# Patient Record
Sex: MALE | Race: WHITE | NOT HISPANIC OR LATINO | Employment: PART TIME | URBAN - METROPOLITAN AREA
[De-identification: names, ages, dates, MRNs, and addresses within clinical notes are randomized per-mention and may not be internally consistent; named-entity substitution may affect disease eponyms.]

---

## 2018-11-06 ENCOUNTER — OFFICE VISIT - HEALTHEAST (OUTPATIENT)
Dept: FAMILY MEDICINE | Facility: CLINIC | Age: 63
End: 2018-11-06

## 2018-11-06 DIAGNOSIS — R03.0 ELEVATED BLOOD-PRESSURE READING WITHOUT DIAGNOSIS OF HYPERTENSION: ICD-10-CM

## 2018-11-06 DIAGNOSIS — R97.20 INCREASED PROSTATE SPECIFIC ANTIGEN (PSA) VELOCITY: ICD-10-CM

## 2018-11-06 DIAGNOSIS — Z12.11 SCREEN FOR COLON CANCER: ICD-10-CM

## 2018-11-06 DIAGNOSIS — Z71.6 ENCOUNTER FOR SMOKING CESSATION COUNSELING: ICD-10-CM

## 2018-11-06 DIAGNOSIS — Z00.00 ANNUAL PHYSICAL EXAM: ICD-10-CM

## 2018-11-06 DIAGNOSIS — E78.1 PURE HYPERGLYCERIDEMIA: ICD-10-CM

## 2018-11-06 LAB
ALBUMIN SERPL-MCNC: 3.8 G/DL (ref 3.5–5)
ALP SERPL-CCNC: 76 U/L (ref 45–120)
ALT SERPL W P-5'-P-CCNC: 70 U/L (ref 0–45)
ANION GAP SERPL CALCULATED.3IONS-SCNC: 10 MMOL/L (ref 5–18)
AST SERPL W P-5'-P-CCNC: 36 U/L (ref 0–40)
BILIRUB SERPL-MCNC: 0.8 MG/DL (ref 0–1)
BUN SERPL-MCNC: 14 MG/DL (ref 8–22)
CALCIUM SERPL-MCNC: 9.6 MG/DL (ref 8.5–10.5)
CHLORIDE BLD-SCNC: 102 MMOL/L (ref 98–107)
CHOLEST SERPL-MCNC: 194 MG/DL
CO2 SERPL-SCNC: 26 MMOL/L (ref 22–31)
CREAT SERPL-MCNC: 0.84 MG/DL (ref 0.7–1.3)
FASTING STATUS PATIENT QL REPORTED: YES
GFR SERPL CREATININE-BSD FRML MDRD: >60 ML/MIN/1.73M2
GLUCOSE BLD-MCNC: 100 MG/DL (ref 70–125)
HBA1C MFR BLD: 5.6 % (ref 3.5–6)
HDLC SERPL-MCNC: 32 MG/DL
LDLC SERPL CALC-MCNC: 125 MG/DL
POTASSIUM BLD-SCNC: 4.5 MMOL/L (ref 3.5–5)
PROT SERPL-MCNC: 6.9 G/DL (ref 6–8)
PSA SERPL-MCNC: 2.7 NG/ML (ref 0–4.5)
SODIUM SERPL-SCNC: 138 MMOL/L (ref 136–145)
TRIGL SERPL-MCNC: 185 MG/DL

## 2018-11-06 ASSESSMENT — MIFFLIN-ST. JEOR: SCORE: 1879.73

## 2018-11-13 ENCOUNTER — COMMUNICATION - HEALTHEAST (OUTPATIENT)
Dept: FAMILY MEDICINE | Facility: CLINIC | Age: 63
End: 2018-11-13

## 2018-11-14 ENCOUNTER — COMMUNICATION - HEALTHEAST (OUTPATIENT)
Dept: FAMILY MEDICINE | Facility: CLINIC | Age: 63
End: 2018-11-14

## 2018-11-30 ENCOUNTER — COMMUNICATION - HEALTHEAST (OUTPATIENT)
Dept: FAMILY MEDICINE | Facility: CLINIC | Age: 63
End: 2018-11-30

## 2018-12-04 ENCOUNTER — OFFICE VISIT - HEALTHEAST (OUTPATIENT)
Dept: FAMILY MEDICINE | Facility: CLINIC | Age: 63
End: 2018-12-04

## 2018-12-04 DIAGNOSIS — I10 HYPERTENSION, UNSPECIFIED TYPE: ICD-10-CM

## 2018-12-04 DIAGNOSIS — E78.5 DYSLIPIDEMIA: ICD-10-CM

## 2018-12-04 DIAGNOSIS — F17.200 TOBACCO USE DISORDER: ICD-10-CM

## 2018-12-11 ENCOUNTER — COMMUNICATION - HEALTHEAST (OUTPATIENT)
Dept: FAMILY MEDICINE | Facility: CLINIC | Age: 63
End: 2018-12-11

## 2018-12-12 ENCOUNTER — COMMUNICATION - HEALTHEAST (OUTPATIENT)
Dept: FAMILY MEDICINE | Facility: CLINIC | Age: 63
End: 2018-12-12

## 2019-01-04 ENCOUNTER — RECORDS - HEALTHEAST (OUTPATIENT)
Dept: ADMINISTRATIVE | Facility: OTHER | Age: 64
End: 2019-01-04

## 2019-01-15 ENCOUNTER — COMMUNICATION - HEALTHEAST (OUTPATIENT)
Dept: FAMILY MEDICINE | Facility: CLINIC | Age: 64
End: 2019-01-15

## 2019-01-15 DIAGNOSIS — I10 HYPERTENSION, UNSPECIFIED TYPE: ICD-10-CM

## 2019-03-18 ENCOUNTER — COMMUNICATION - HEALTHEAST (OUTPATIENT)
Dept: FAMILY MEDICINE | Facility: CLINIC | Age: 64
End: 2019-03-18

## 2019-05-08 ENCOUNTER — COMMUNICATION - HEALTHEAST (OUTPATIENT)
Dept: FAMILY MEDICINE | Facility: CLINIC | Age: 64
End: 2019-05-08

## 2019-05-08 ENCOUNTER — AMBULATORY - HEALTHEAST (OUTPATIENT)
Dept: NURSING | Facility: CLINIC | Age: 64
End: 2019-05-08

## 2019-05-08 ENCOUNTER — AMBULATORY - HEALTHEAST (OUTPATIENT)
Dept: LAB | Facility: CLINIC | Age: 64
End: 2019-05-08

## 2019-05-08 DIAGNOSIS — I10 HYPERTENSION, UNSPECIFIED TYPE: ICD-10-CM

## 2019-05-08 LAB
ANION GAP SERPL CALCULATED.3IONS-SCNC: 10 MMOL/L (ref 5–18)
BUN SERPL-MCNC: 19 MG/DL (ref 8–22)
CALCIUM SERPL-MCNC: 9.7 MG/DL (ref 8.5–10.5)
CHLORIDE BLD-SCNC: 106 MMOL/L (ref 98–107)
CO2 SERPL-SCNC: 24 MMOL/L (ref 22–31)
CREAT SERPL-MCNC: 0.98 MG/DL (ref 0.7–1.3)
GFR SERPL CREATININE-BSD FRML MDRD: >60 ML/MIN/1.73M2
GLUCOSE BLD-MCNC: 110 MG/DL (ref 70–125)
POTASSIUM BLD-SCNC: 4.3 MMOL/L (ref 3.5–5)
SODIUM SERPL-SCNC: 140 MMOL/L (ref 136–145)

## 2019-10-02 ENCOUNTER — COMMUNICATION - HEALTHEAST (OUTPATIENT)
Dept: FAMILY MEDICINE | Facility: CLINIC | Age: 64
End: 2019-10-02

## 2019-10-02 DIAGNOSIS — I10 HYPERTENSION, UNSPECIFIED TYPE: ICD-10-CM

## 2019-10-24 ENCOUNTER — COMMUNICATION - HEALTHEAST (OUTPATIENT)
Dept: FAMILY MEDICINE | Facility: CLINIC | Age: 64
End: 2019-10-24

## 2019-10-25 ENCOUNTER — AMBULATORY - HEALTHEAST (OUTPATIENT)
Dept: NURSING | Facility: CLINIC | Age: 64
End: 2019-10-25

## 2020-01-22 ENCOUNTER — OFFICE VISIT - HEALTHEAST (OUTPATIENT)
Dept: FAMILY MEDICINE | Facility: CLINIC | Age: 65
End: 2020-01-22

## 2020-01-22 DIAGNOSIS — I10 HYPERTENSION, UNSPECIFIED TYPE: ICD-10-CM

## 2020-01-22 DIAGNOSIS — E55.9 VITAMIN D INSUFFICIENCY: ICD-10-CM

## 2020-01-22 DIAGNOSIS — E78.1 PURE HYPERGLYCERIDEMIA: ICD-10-CM

## 2020-01-22 DIAGNOSIS — F17.200 TOBACCO USE DISORDER: ICD-10-CM

## 2020-01-22 DIAGNOSIS — Z12.5 SCREENING PSA (PROSTATE SPECIFIC ANTIGEN): ICD-10-CM

## 2020-01-22 DIAGNOSIS — E66.09 CLASS 1 OBESITY DUE TO EXCESS CALORIES WITHOUT SERIOUS COMORBIDITY WITH BODY MASS INDEX (BMI) OF 32.0 TO 32.9 IN ADULT: ICD-10-CM

## 2020-01-22 DIAGNOSIS — D12.6 BENIGN NEOPLASM OF COLON, UNSPECIFIED PART OF COLON: ICD-10-CM

## 2020-01-22 DIAGNOSIS — E66.811 CLASS 1 OBESITY DUE TO EXCESS CALORIES WITHOUT SERIOUS COMORBIDITY WITH BODY MASS INDEX (BMI) OF 32.0 TO 32.9 IN ADULT: ICD-10-CM

## 2020-01-22 LAB
ALBUMIN SERPL-MCNC: 4 G/DL (ref 3.5–5)
ALP SERPL-CCNC: 77 U/L (ref 45–120)
ALT SERPL W P-5'-P-CCNC: 86 U/L (ref 0–45)
ANION GAP SERPL CALCULATED.3IONS-SCNC: 11 MMOL/L (ref 5–18)
AST SERPL W P-5'-P-CCNC: 41 U/L (ref 0–40)
BILIRUB SERPL-MCNC: 0.7 MG/DL (ref 0–1)
BUN SERPL-MCNC: 16 MG/DL (ref 8–22)
CALCIUM SERPL-MCNC: 9.2 MG/DL (ref 8.5–10.5)
CHLORIDE BLD-SCNC: 104 MMOL/L (ref 98–107)
CHOLEST SERPL-MCNC: 225 MG/DL
CO2 SERPL-SCNC: 23 MMOL/L (ref 22–31)
CREAT SERPL-MCNC: 0.91 MG/DL (ref 0.7–1.3)
FASTING STATUS PATIENT QL REPORTED: YES
GFR SERPL CREATININE-BSD FRML MDRD: >60 ML/MIN/1.73M2
GLUCOSE BLD-MCNC: 104 MG/DL (ref 70–125)
HDLC SERPL-MCNC: 32 MG/DL
LDLC SERPL CALC-MCNC: 156 MG/DL
POTASSIUM BLD-SCNC: 5 MMOL/L (ref 3.5–5)
PROT SERPL-MCNC: 7 G/DL (ref 6–8)
PSA SERPL-MCNC: 3.1 NG/ML (ref 0–4.5)
SODIUM SERPL-SCNC: 138 MMOL/L (ref 136–145)
TRIGL SERPL-MCNC: 185 MG/DL

## 2020-01-22 ASSESSMENT — MIFFLIN-ST. JEOR: SCORE: 1830.89

## 2020-01-23 ENCOUNTER — COMMUNICATION - HEALTHEAST (OUTPATIENT)
Dept: FAMILY MEDICINE | Facility: CLINIC | Age: 65
End: 2020-01-23

## 2020-01-23 DIAGNOSIS — R74.8 ELEVATED LIVER ENZYMES: ICD-10-CM

## 2020-01-23 LAB
25(OH)D3 SERPL-MCNC: 22.2 NG/ML (ref 30–80)
25(OH)D3 SERPL-MCNC: 22.2 NG/ML (ref 30–80)

## 2020-01-27 ENCOUNTER — COMMUNICATION - HEALTHEAST (OUTPATIENT)
Dept: FAMILY MEDICINE | Facility: CLINIC | Age: 65
End: 2020-01-27

## 2020-01-29 ENCOUNTER — AMBULATORY - HEALTHEAST (OUTPATIENT)
Dept: NURSING | Facility: CLINIC | Age: 65
End: 2020-01-29

## 2020-01-29 ENCOUNTER — COMMUNICATION - HEALTHEAST (OUTPATIENT)
Dept: FAMILY MEDICINE | Facility: CLINIC | Age: 65
End: 2020-01-29

## 2020-01-29 ENCOUNTER — AMBULATORY - HEALTHEAST (OUTPATIENT)
Dept: FAMILY MEDICINE | Facility: CLINIC | Age: 65
End: 2020-01-29

## 2020-01-29 DIAGNOSIS — I10 BENIGN ESSENTIAL HYPERTENSION: ICD-10-CM

## 2020-01-29 DIAGNOSIS — F17.200 TOBACCO USE DISORDER: ICD-10-CM

## 2020-02-07 ENCOUNTER — AMBULATORY - HEALTHEAST (OUTPATIENT)
Dept: NURSING | Facility: CLINIC | Age: 65
End: 2020-02-07

## 2020-02-25 ENCOUNTER — COMMUNICATION - HEALTHEAST (OUTPATIENT)
Dept: FAMILY MEDICINE | Facility: CLINIC | Age: 65
End: 2020-02-25

## 2020-02-25 ENCOUNTER — HOSPITAL ENCOUNTER (OUTPATIENT)
Dept: CT IMAGING | Facility: HOSPITAL | Age: 65
Discharge: HOME | End: 2020-02-25
Attending: FAMILY MEDICINE

## 2020-02-25 DIAGNOSIS — F17.200 TOBACCO USE DISORDER: ICD-10-CM

## 2020-02-26 ENCOUNTER — OFFICE VISIT - HEALTHEAST (OUTPATIENT)
Dept: FAMILY MEDICINE | Facility: CLINIC | Age: 65
End: 2020-02-26

## 2020-02-26 ENCOUNTER — COMMUNICATION - HEALTHEAST (OUTPATIENT)
Dept: FAMILY MEDICINE | Facility: CLINIC | Age: 65
End: 2020-02-26

## 2020-02-26 DIAGNOSIS — I10 BENIGN ESSENTIAL HYPERTENSION: ICD-10-CM

## 2020-02-26 LAB
ANION GAP SERPL CALCULATED.3IONS-SCNC: 9 MMOL/L (ref 5–18)
BUN SERPL-MCNC: 15 MG/DL (ref 8–22)
CALCIUM SERPL-MCNC: 9.5 MG/DL (ref 8.5–10.5)
CHLORIDE BLD-SCNC: 102 MMOL/L (ref 98–107)
CO2 SERPL-SCNC: 25 MMOL/L (ref 22–31)
CREAT SERPL-MCNC: 0.94 MG/DL (ref 0.7–1.3)
GFR SERPL CREATININE-BSD FRML MDRD: >60 ML/MIN/1.73M2
GLUCOSE BLD-MCNC: 107 MG/DL (ref 70–125)
POTASSIUM BLD-SCNC: 4.4 MMOL/L (ref 3.5–5)
SODIUM SERPL-SCNC: 136 MMOL/L (ref 136–145)

## 2020-03-17 ENCOUNTER — COMMUNICATION - HEALTHEAST (OUTPATIENT)
Dept: FAMILY MEDICINE | Facility: CLINIC | Age: 65
End: 2020-03-17

## 2020-04-15 ENCOUNTER — AMBULATORY - HEALTHEAST (OUTPATIENT)
Dept: LAB | Facility: CLINIC | Age: 65
End: 2020-04-15

## 2020-04-15 DIAGNOSIS — R74.8 ELEVATED LIVER ENZYMES: ICD-10-CM

## 2020-04-15 LAB
ALBUMIN SERPL-MCNC: 4.2 G/DL (ref 3.5–5)
ALP SERPL-CCNC: 75 U/L (ref 45–120)
ALT SERPL W P-5'-P-CCNC: 102 U/L (ref 0–45)
AST SERPL W P-5'-P-CCNC: 47 U/L (ref 0–40)
BILIRUB DIRECT SERPL-MCNC: 0.3 MG/DL
BILIRUB SERPL-MCNC: 0.9 MG/DL (ref 0–1)
PROT SERPL-MCNC: 7.5 G/DL (ref 6–8)

## 2020-04-16 ENCOUNTER — COMMUNICATION - HEALTHEAST (OUTPATIENT)
Dept: FAMILY MEDICINE | Facility: CLINIC | Age: 65
End: 2020-04-16

## 2020-04-17 ENCOUNTER — COMMUNICATION - HEALTHEAST (OUTPATIENT)
Dept: FAMILY MEDICINE | Facility: CLINIC | Age: 65
End: 2020-04-17

## 2020-04-17 ENCOUNTER — AMBULATORY - HEALTHEAST (OUTPATIENT)
Dept: FAMILY MEDICINE | Facility: CLINIC | Age: 65
End: 2020-04-17

## 2020-04-17 DIAGNOSIS — R74.8 ELEVATED LIVER ENZYMES: ICD-10-CM

## 2020-10-13 ENCOUNTER — COMMUNICATION - HEALTHEAST (OUTPATIENT)
Dept: FAMILY MEDICINE | Facility: CLINIC | Age: 65
End: 2020-10-13

## 2020-10-13 DIAGNOSIS — F17.200 TOBACCO USE DISORDER: ICD-10-CM

## 2020-10-14 RX ORDER — VARENICLINE TARTRATE 1 MG/1
TABLET, FILM COATED ORAL
Qty: 60 TABLET | Refills: 1 | Status: SHIPPED | OUTPATIENT
Start: 2020-10-14 | End: 2021-12-14

## 2020-11-11 ENCOUNTER — COMMUNICATION - HEALTHEAST (OUTPATIENT)
Dept: FAMILY MEDICINE | Facility: CLINIC | Age: 65
End: 2020-11-11

## 2020-12-17 ENCOUNTER — COMMUNICATION - HEALTHEAST (OUTPATIENT)
Dept: FAMILY MEDICINE | Facility: CLINIC | Age: 65
End: 2020-12-17

## 2020-12-17 DIAGNOSIS — I10 HYPERTENSION, UNSPECIFIED TYPE: ICD-10-CM

## 2021-01-21 ENCOUNTER — COMMUNICATION - HEALTHEAST (OUTPATIENT)
Dept: FAMILY MEDICINE | Facility: CLINIC | Age: 66
End: 2021-01-21

## 2021-02-24 ENCOUNTER — COMMUNICATION - HEALTHEAST (OUTPATIENT)
Dept: FAMILY MEDICINE | Facility: CLINIC | Age: 66
End: 2021-02-24

## 2021-02-24 ENCOUNTER — COMMUNICATION - HEALTHEAST (OUTPATIENT)
Dept: PULMONOLOGY | Facility: OTHER | Age: 66
End: 2021-02-24

## 2021-02-24 DIAGNOSIS — I10 BENIGN ESSENTIAL HYPERTENSION: ICD-10-CM

## 2021-03-16 ENCOUNTER — COMMUNICATION - HEALTHEAST (OUTPATIENT)
Dept: FAMILY MEDICINE | Facility: CLINIC | Age: 66
End: 2021-03-16

## 2021-03-16 DIAGNOSIS — I10 HYPERTENSION, UNSPECIFIED TYPE: ICD-10-CM

## 2021-05-24 ENCOUNTER — RECORDS - HEALTHEAST (OUTPATIENT)
Dept: ADMINISTRATIVE | Facility: OTHER | Age: 66
End: 2021-05-24

## 2021-05-24 ENCOUNTER — RECORDS - HEALTHEAST (OUTPATIENT)
Dept: FAMILY MEDICINE | Facility: CLINIC | Age: 66
End: 2021-05-24

## 2021-05-24 DIAGNOSIS — I10 BENIGN ESSENTIAL HYPERTENSION: ICD-10-CM

## 2021-05-24 RX ORDER — HYDROCHLOROTHIAZIDE 12.5 MG/1
12.5 CAPSULE ORAL DAILY
Qty: 30 CAPSULE | Refills: 0 | Status: SHIPPED | OUTPATIENT
Start: 2021-05-24 | End: 2021-12-22

## 2021-05-27 VITALS — HEART RATE: 56 BPM | DIASTOLIC BLOOD PRESSURE: 87 MMHG | SYSTOLIC BLOOD PRESSURE: 155 MMHG

## 2021-05-27 VITALS — DIASTOLIC BLOOD PRESSURE: 82 MMHG | SYSTOLIC BLOOD PRESSURE: 138 MMHG

## 2021-05-28 NOTE — TELEPHONE ENCOUNTER
Who is calling:  Patient  Reason for Call:  Patient states he received voicemail message from care team to call back- Writer did not see an open encounter with a message to relay to patient- He thinks it may have something to do with nurse visit for blood pressure today.   Date of last appointment with primary care: 05/08/19  Okay to leave a detailed message: Yes

## 2021-05-28 NOTE — PROGRESS NOTES
Please inform patient that the blood pressure is in desired range.  Continue with current medications and plan.  Mehul Del Toro MD  5/8/2019

## 2021-05-28 NOTE — PROGRESS NOTES
I met with Gualberto Wood at the request of Dr. Del Toro to recheck his blood pressure.  Blood pressure medications on the MAR were reviewed with patient.    Patient has taken all medications as per usual regimen: Yes  Patient reports tolerating them without any issues or concerns: Yes    Vitals:    05/08/19 0727   BP: 124/86       Blood pressure was taken, previous encounter was reviewed, recorded blood pressure below 140/90.  Patient was discharged and the note will be sent to the provider for final review.

## 2021-06-01 NOTE — TELEPHONE ENCOUNTER
Lisinopril pending for PCP review    Requested Prescriptions     Pending Prescriptions Disp Refills     lisinopril (PRINIVIL,ZESTRIL) 10 MG tablet 180 tablet 3     Sig: Take 2 tablets (20 mg total) by mouth daily.

## 2021-06-01 NOTE — TELEPHONE ENCOUNTER
Mehul  I'll send this your way as it looks like all care and communication has been with you over the past year.  Feel free to make a change in the PCP in the header.  Thanks.  Brett

## 2021-06-01 NOTE — TELEPHONE ENCOUNTER
Medication refill.  Patient needs to schedule annual physical.  Mehul Del Toro MD  10/2/2019

## 2021-06-02 VITALS — HEIGHT: 70 IN | WEIGHT: 239 LBS | BODY MASS INDEX: 34.22 KG/M2

## 2021-06-02 VITALS — BODY MASS INDEX: 34.37 KG/M2 | WEIGHT: 241.4 LBS

## 2021-06-04 VITALS
BODY MASS INDEX: 32.81 KG/M2 | WEIGHT: 229.2 LBS | DIASTOLIC BLOOD PRESSURE: 97 MMHG | HEIGHT: 70 IN | SYSTOLIC BLOOD PRESSURE: 139 MMHG | OXYGEN SATURATION: 95 % | HEART RATE: 69 BPM

## 2021-06-04 VITALS
BODY MASS INDEX: 32.94 KG/M2 | HEART RATE: 65 BPM | TEMPERATURE: 97.3 F | WEIGHT: 229.6 LBS | SYSTOLIC BLOOD PRESSURE: 118 MMHG | RESPIRATION RATE: 20 BRPM | DIASTOLIC BLOOD PRESSURE: 77 MMHG

## 2021-06-05 NOTE — PROGRESS NOTES
Follow Up Blood Pressure Check    Gualberto Wood is a 64 y.o. male recommended to follow up for blood pressure check by Brett Groves MD. Anihypertensive medications and adherence were verified: Yes.     Reason for visit: Follow up from physical    Switch the lisinopril to a 20 mg tab daily  Nurse BP check    Medication change at last visit: No- just changed to 20mg tablet instead of 2 10mg tablets     Today's Vitals:   Vitals:    01/29/20 0858 01/29/20 0901   BP: 167/88 155/87   Pulse: (!) 58 (!) 56       Home blood pressure readings brought in today:   NA    Lowest blood pressure today is 155/87 and they deny signs or symptoms of new onset: .  Please inform patient of his/her blood pressure today.  If they are asymptomatic, the patient is to continue current medications.  This message will be routed to their provider, and they will be notified if a change in medication is recommended.    ( may be deleted if not applicable) If lowest blood pressure is greater than 200/110, regardless if symptoms are present, patient needs to be evaluated by a provider today.    Hali Veloz    Current Outpatient Medications   Medication Sig Dispense Refill     aspirin 81 MG EC tablet Take 81 mg by mouth daily.       DOCOSAHEXANOIC ACID/EPA (FISH OIL ORAL) Take 1 capsule by mouth daily.       lisinopril (PRINIVIL,ZESTRIL) 20 MG tablet Take 1 tablet (20 mg total) by mouth daily. 90 tablet 3     VITAMIN B COMPLEX ORAL Take 1 tablet by mouth daily.       vitamin E acetate (VITAMIN E ORAL) Take 1 tablet by mouth every other day.       No current facility-administered medications for this visit.

## 2021-06-05 NOTE — PROGRESS NOTES
I would suggest adding hctz 12.5 mg daily.  Will call new rx.  Nurse BP check in one week  appt  To check BP and labs in 3 weeks.

## 2021-06-05 NOTE — TELEPHONE ENCOUNTER
Reason contacted:  Results  Information relayed:  Relayed results below from Dr. Groves to patient.  Pt states he will send Dr. Groves a [employer] message to discuss starting a statin.  Additional questions:  Yes-Pt states he will send Dr. Groves a [employer] message  Further follow-up needed:  Lucero Rousseau:  I just left you a phone message with the info below.  Your PSA has been slowly increasing (within the normal range and by acceptable amounts).   I would suggest a repeat PSA in one year.     The liver enzymes are mildly elevated.   Work on weight loss  To get your weight as close to 200 lbs as possible.  In the meantime you should avoid alcohol.  I would suggest repeating liver enzymes in 3 months and have entered a future lab order for this.   Just set up a lab appt in 3 months.     Your Vit D  Level is slightly low.  If you do not currently take Vit D then I would suggest you take 2000 units daily.     Lastly your cholesterol and LDL are moderately elevated. (see below)  There is a new model to calculate your 10 year Cardiovascular Disease  risk.  Using this model which takes into account age, gender, race, systolic blood pressure, cholesterol and HDL, and the presence or absence of diabetes, use of blood pressure meds or nicotine, your risk is shown below.     Your risk of having a cardiovascular event over the next 10 years is 16 %.   When this risk exceeds 7.5% one may benefit from a statin medication to lower the cholesterol.     If you start a statin then you would require close monitoring of your liver functions with the first check being 6 weeks after starting the statin.  Also you would need to monitor for any muscle soreness or pain, something that statins can sometimes cause.  If you are interested in initiating a statin medication please notify us and we can have this called to your pharmacy.     Your remaining labs are normal.

## 2021-06-05 NOTE — TELEPHONE ENCOUNTER
Brett Groves MD at 1/29/2020 10:02 AM     Status: Signed      I would suggest adding hctz 12.5 mg daily.  Will call new rx.  Nurse BP check in one week  appt  To check BP and labs in 3 weeks.

## 2021-06-05 NOTE — PATIENT INSTRUCTIONS - HE
Colonoscopy due in Jan 2022    Fasting labs    Check with insurance on Shingrix coverage    Switch the lisinopril to a 20 mg tab daily  Nurse BP check    The following high BMI interventions were performed this visit: encouragement to exercise and dietary management education, guidance, and counseling     Low dose CT scan of the chest    Next year get abdominal U/S to screen for AAA        .Lung Cancer Screening pre-scan counseling Visit    The patient fits the risk profile of patients who benefit from this screening:  -The patient is >55 years old and <80 years old  -The patient has 30 pack year history (over 30)  -The patient has smoked within the past 15 years  -The patient has no medical comorbidity severe enough that it would cause mortality prior to mortality due to the lung cancer attempting to be detected.    Discussion with patient regarding the harms associated with LDCT screening include false-negative and false-positive results, incidental findings, overdiagnosis, and radiation exposure were reviewed at length.   The patient understands that pursuing this screening test may result in a biopsy that was not necessary. It may also produce added stress over a nodule that is likely not cancer.    Of 100 patients who get screening, 25 will have a positive scan. Of those 25, only 1 will have cancer.  Overdiagnosis is estimated at 10% of patients-- they would not have been detected in the patient's lifetime without screening. Less than 1% of patients likely had death related to radiation exposure increase.   Average low-dose CT associated with 0.61 to 1.5 mSv. Annual background radiation exposure in the [place] averages 2.4 mSv; mammogram is 0.7mSv.    The benefits are reduction in risk of death from lung cancer. The number needed to treat is 320 (for every 320 patients who undergo screening, 1 patient will have a benefit in mortality from early detection from the screening).    Undergoing this screening  implies willingness to pursue further potentially invasive testing to discover potential cancer.    All questions were answered.    The patient was counseled regarding smoking cessation and its risk for lung cancer.

## 2021-06-05 NOTE — TELEPHONE ENCOUNTER
Left message to call back for: Result follow up  Information to relay to patient:  See message below.  Please relay information.

## 2021-06-05 NOTE — TELEPHONE ENCOUNTER
----- Message from Brett Groves MD sent at 1/24/2020  4:58 PM CST -----  Please review lab letter with pt and let me know if he has questions or if he wants to start a statin.  thanks

## 2021-06-05 NOTE — PROGRESS NOTES
Assessment:      Healthy male exam.    Encounter Diagnoses   Name Primary?     Benign neoplasm of colon, unspecified part of colon Yes     Essential Hypertriglyceridemia      Nicotine Dependence      Hypertension, unspecified                          The diagnosis was confirmed.  The condition is stable/controlled on LISINOPRIL and no side effects  have been noted.  The appropriate follow up labs  ( CMP )have been ordered  (OR ARE UP TO DATE) and medication refills ordered if requested.        Screening PSA (prostate specific antigen)      Vitamin D insufficiency      Class 1 obesity due to excess calories without serious comorbidity with body mass index (BMI) of 32.0 to 32.9 in adult          Plan:       All questions answered.       Colonoscopy due in Jan 2022    Fasting labs    Check with insurance on Shingrix coverage    Switch the lisinopril to a 20 mg tab daily  Nurse BP check    The following high BMI interventions were performed this visit: encouragement to exercise and dietary management education, guidance, and counseling     Low dose CT scan of the chest    Next year get abdominal U/S to screen for AAA    I have counseled the patient for tobacco cessation and the follow up will occur  at the next visit.   DISCUSSION FOR > 3 MIN REGARDING NEED TO SET A QUIT DATE (AT TIME OF HYPNOSIS) AND ALSO OF SCREENING FOR LUNG CANCER WITH A LOWDOSE CT OF THE CHEST.          .Lung Cancer Screening pre-scan counseling Visit    The patient fits the risk profile of patients who benefit from this screening:  -The patient is >55 years old and <80 years old  -The patient has 30 pack year history (over 30)  -The patient has smoked within the past 15 years  -The patient has no medical comorbidity severe enough that it would cause mortality prior to mortality due to the lung cancer attempting to be detected.    Discussion with patient regarding the harms associated with LDCT screening include false-negative and false-positive  results, incidental findings, overdiagnosis, and radiation exposure were reviewed at length.   The patient understands that pursuing this screening test may result in a biopsy that was not necessary. It may also produce added stress over a nodule that is likely not cancer.    Of 100 patients who get screening, 25 will have a positive scan. Of those 25, only 1 will have cancer.  Overdiagnosis is estimated at 10% of patients-- they would not have been detected in the patient's lifetime without screening. Less than 1% of patients likely had death related to radiation exposure increase.   Average low-dose CT associated with 0.61 to 1.5 mSv. Annual background radiation exposure in the [place] averages 2.4 mSv; mammogram is 0.7mSv.    The benefits are reduction in risk of death from lung cancer. The number needed to treat is 320 (for every 320 patients who undergo screening, 1 patient will have a benefit in mortality from early detection from the screening).    Undergoing this screening implies willingness to pursue further potentially invasive testing to discover potential cancer.    All questions were answered.    The patient was counseled regarding smoking cessation and its risk for lung cancer.         Subjective:      Gualberto Wood is a 64 y.o. male who presents for an annual exam. The patient reports that there is not domestic violence in his life.     Healthy Habits:   Regular Exercise: Yes and some walking  Sunscreen Use: Yes  Healthy Diet: Yes  Dental Visits Regularly: Yes  Seat Belt: Yes  Sexually active: Yes  Monthly Self Testicular Exams:  Yes  Hemoccults: N/A  Flex Sig: N/A  Colonoscopy: Yes and 1-4-19 and due in 2022  Lipid Profile: Yes  Glucose Screen: Yes  Prevention of Osteoporosis: N/A  Last Dexa: N/A  Guns at Home:  Yes  Guns Safety Locks:  No and Gun safe/class:  No      Immunization History   Administered Date(s) Administered     Hep A, historic 11/06/2007, 02/17/2010      INFLUENZA,RECOMBINANT,INJ,PF QUADRIVALENT 18+YRS 11/06/2018, 10/25/2019     Influenza, inj, historic,unspecified 11/06/2007, 12/08/2008     Influenza, seasonal,quad inj 6-35 mos 10/25/2010     Influenza,seasonal,quad inj =/> 6months 09/12/2016     Td,adult,historic,unspecified 01/05/2007     Tdap 01/05/2007, 09/12/2016     Immunization status: up to date and documented.    No exam data present    Current Outpatient Medications   Medication Sig Dispense Refill     aspirin 81 MG EC tablet Take 81 mg by mouth daily.       DOCOSAHEXANOIC ACID/EPA (FISH OIL ORAL) Take 1 capsule by mouth daily.       lisinopril (PRINIVIL,ZESTRIL) 20 MG tablet Take 1 tablet (20 mg total) by mouth daily. 90 tablet 3     varenicline (CHANTIX STARTING MONTH BOX) 0.5 mg (11)- 1 mg (42) tablet 1 wk before you stop smoking take 0.5mg daily on days 1-3, 0.5mg 2 times each day on days 4-7, then 1mg 2 times daily. 53 tablet 0     varenicline (CHANTIX) 1 mg tablet Take 1 tab by mouth two times a day. Take after eating with a full glass of water. NOTE: Dispense as maintenance for refills only. 60 tablet 2     VITAMIN B COMPLEX ORAL Take 1 tablet by mouth daily.       vitamin E acetate (VITAMIN E ORAL) Take 1 tablet by mouth every other day.       No current facility-administered medications for this visit.      No past medical history on file.  Past Surgical History:   Procedure Laterality Date     bowel obstruction  10/2012     UT KNEE SCOPE,DIAGNOSTIC      Description: Arthroscopy Knee Left;  Recorded: 10/25/2010;  Comments: ACL reconstruction in 1991, repair of meniscus 10/2010     Patient has no known allergies.  No family history on file.  Social History     Socioeconomic History     Marital status:      Spouse name: Not on file     Number of children: Not on file     Years of education: Not on file     Highest education level: Not on file   Occupational History     Not on file   Social Needs     Financial resource strain: Not on file      Food insecurity:     Worry: Not on file     Inability: Not on file     Transportation needs:     Medical: Not on file     Non-medical: Not on file   Tobacco Use     Smoking status: Current Every Day Smoker     Packs/day: 0.75     Types: Cigarettes     Smokeless tobacco: Never Used   Substance and Sexual Activity     Alcohol use: Yes     Comment: 4 drinks per wk     Drug use: No     Sexual activity: Yes     Partners: Female     Birth control/protection: Surgical     Comment: Vasectomy   Lifestyle     Physical activity:     Days per week: Not on file     Minutes per session: Not on file     Stress: Not on file   Relationships     Social connections:     Talks on phone: Not on file     Gets together: Not on file     Attends [employer] service: Not on file     Active member of club or organization: Not on file     Attends meetings of clubs or organizations: Not on file     Relationship status: Not on file     Intimate partner violence:     Fear of current or ex partner: Not on file     Emotionally abused: Not on file     Physically abused: Not on file     Forced sexual activity: Not on file   Other Topics Concern     Not on file   Social History Narrative    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    Lives with wife.    Has adult children.    Mehul Del Toro MD  11/6/2018            The 10-year ASCVD risk score (Faustino SIMONS Jr et al., 2013) is: 38.1%      Values used to calculate the score:        Age: 63 years        Sex: Male        Is Non- : No        Diabetic: No        Tobacco smoker: Yes        Systolic Blood Pressure: 188 mmHg        Is BP treated: No        HDL Cholesterol: 28 mg/dL        Total Cholesterol: 190 mg/dL    The 10-year ASCVD risk score (Faustino SIMONS Jr et al., 2013) is: 29.7%      Values used to calculate the score:        Age: 63 years        Sex: Male        Is Non- : No        Diabetic: No        Tobacco smoker: Yes        Systolic Blood Pressure:  "158 mmHg        Is BP treated: No        HDL Cholesterol: 28 mg/dL        Total Cholesterol: 190 mg/dL       Review of Systems  General:  Denies problem  Eyes: Denies problem  Ears/Nose/Throat: Denies problem  Cardiovascular: Denies problem  Respiratory:  Denies problem  Gastrointestinal:  Denies problem  Genitourinary: Denies problem  Musculoskeletal:  Denies problem  Skin: Denies problem  Neurologic: Denies problem  Psychiatric: Denies problem  Endocrine: Denies problem  Heme/Lymphatic: Denies problem   Allergic/Immunologic: Denies problem        Objective:     Vitals:    01/22/20 0750   BP: (!) 139/97   Pulse: 69   SpO2: 95%   Weight: (!) 229 lb 3.2 oz (104 kg)   Height: 5' 10\" (1.778 m)     Body mass index is 32.89 kg/m .  Wt Readings from Last 3 Encounters:   01/22/20 (!) 229 lb 3.2 oz (104 kg)   12/04/18 (!) 241 lb 6.4 oz (109.5 kg)   11/06/18 (!) 239 lb (108.4 kg)      Physical  General Appearance: Alert, cooperative, no distress, appears stated age  Head: Normocephalic, without obvious abnormality, atraumatic  Eyes: PERRL, conjunctiva/corneas clear, EOM's intact  Ears: Normal TM's and external ear canals, both ears  Nose: Nares normal, septum midline,mucosa normal, no drainage  Throat: Lips, mucosa, and tongue normal; teeth and gums normal  Neck: Supple, symmetrical, trachea midline, no adenopathy;  thyroid: not enlarged, symmetric, no tenderness/mass/nodules; no carotid bruit or JVD  Back: Symmetric, no curvature, ROM normal, no CVA tenderness  Lungs: Clear to auscultation bilaterally, respirations unlabored  Heart: Regular rate and rhythm, S1 and S2 normal, no murmur, rub, or gallop,  Abdomen: Soft, non-tender, bowel sounds active all four quadrants,  no masses, no organomegaly  Genitourinary: Penis normal. Right testis is descended. Left testis is descended.   prostate exam discussed and declined  Musculoskeletal: Normal range of motion. No joint swelling or deformity.   Extremities: Extremities normal, " atraumatic, no cyanosis or edema  Skin: Skin color, texture, turgor normal, no rashes or lesions  Lymph nodes: Cervical, supraclavicular, and axillary nodes normal  Neurologic: He is alert. He has normal reflexes.   Psychiatric: He has a normal mood and affect.        Lab Results   Component Value Date    PSA 2.7 11/06/2018    PSA 1.8 09/12/2016    PSA 1.0 05/10/2010     Vitamin D, Total (25-Hydroxy)   Date Value Ref Range Status   09/12/2016 20.1 (L) 30.0 - 80.0 ng/mL Final      Results for orders placed or performed in visit on 11/06/18   Comprehensive Metabolic Panel   Result Value Ref Range    Sodium 138 136 - 145 mmol/L    Potassium 4.5 3.5 - 5.0 mmol/L    Chloride 102 98 - 107 mmol/L    CO2 26 22 - 31 mmol/L    Anion Gap, Calculation 10 5 - 18 mmol/L    Glucose 100 70 - 125 mg/dL    BUN 14 8 - 22 mg/dL    Creatinine 0.84 0.70 - 1.30 mg/dL    GFR MDRD Af Amer >60 >60 mL/min/1.73m2    GFR MDRD Non Af Amer >60 >60 mL/min/1.73m2    Bilirubin, Total 0.8 0.0 - 1.0 mg/dL    Calcium 9.6 8.5 - 10.5 mg/dL    Protein, Total 6.9 6.0 - 8.0 g/dL    Albumin 3.8 3.5 - 5.0 g/dL    Alkaline Phosphatase 76 45 - 120 U/L    AST 36 0 - 40 U/L    ALT 70 (H) 0 - 45 U/L

## 2021-06-05 NOTE — PROGRESS NOTES
I met with Gualberto Wood at the request of Dr. Groves to recheck his blood pressure.  Blood pressure medications on the MAR were reviewed with patient.    Patient has taken all medications as per usual regimen: Yes  Patient reports tolerating them without any issues or concerns: Yes    Vitals:    02/07/20 0815   BP: 138/82       Blood pressure was taken, previous encounter was reviewed, recorded blood pressure below 140/90.  Patient was discharged and the note will be sent to the provider for final review.

## 2021-06-06 NOTE — PATIENT INSTRUCTIONS - HE
Recheck potassium and kidney function (BMP)/   Note that pt is fasting today.    continue present meds.    Recheck liver enzymes at the end of March 2020

## 2021-06-06 NOTE — PROGRESS NOTES
DIAGNOSIS:  1. Benign essential hypertension  Basic Metabolic Panel    hydroCHLOROthiazide (MICROZIDE) 12.5 mg capsule       PLAN:     Recheck potassium and kidney function (BMP)/   Note that pt is fasting today.    continue present meds.    Recheck liver enzymes at the end of March 2020            HPI:   Here for a BP follow up.  On 1-29-20 we added hctz 12.5 mg daily.  No side effects on hctz            Current Outpatient Medications on File Prior to Visit   Medication Sig Dispense Refill     aspirin 81 MG EC tablet Take 81 mg by mouth daily.       cholecalciferol, vitamin D3, (VITAMIN D3 ORAL) Take 1 tablet by mouth daily.       DOCOSAHEXANOIC ACID/EPA (FISH OIL ORAL) Take 1 capsule by mouth daily.       lisinopril (PRINIVIL,ZESTRIL) 20 MG tablet Take 1 tablet (20 mg total) by mouth daily. 90 tablet 3     vitamin E acetate (VITAMIN E ORAL) Take 1 tablet by mouth every other day.       [DISCONTINUED] hydroCHLOROthiazide (MICROZIDE) 12.5 mg capsule Take 1 capsule (12.5 mg total) by mouth daily. 30 capsule 1     VITAMIN B COMPLEX ORAL Take 1 tablet by mouth daily.       No current facility-administered medications on file prior to visit.        Pmh: reviewed  Psh: reviewed  Allergy:  reviewed      EXAM:    /77   Pulse 65   Temp 97.3  F (36.3  C) (Oral)   Resp 20   Wt (!) 229 lb 9.6 oz (104.1 kg)   BMI 32.94 kg/m    GEN:   ALERT, NAD, ORIENTED TIMES THREE  NECK: SUPPLE WITHOUT ADENOPATHY OR THYROMEGALY  LUNGS: CTA  COR: RRR WITHOUT MURMUR  SKIN: NO RASH , ULCERS OR LESIONS NOTED  EXT: WITHOUT EDEMA/SWELLING    No results found for this or any previous visit (from the past 168 hour(s)).

## 2021-06-06 NOTE — TELEPHONE ENCOUNTER
Upcoming Appointment Question  When is the appointment: 03/27  What is your appointment for?: Lab  Who is your appointment scheduled with?: Lab  What is your question/concern?: Patient returning call to r/s appt. He's okay with rescheduling on 04/10 for 8:15am  Okay to leave a detailed message?: Yes

## 2021-06-10 ENCOUNTER — COMMUNICATION - HEALTHEAST (OUTPATIENT)
Dept: FAMILY MEDICINE | Facility: CLINIC | Age: 66
End: 2021-06-10

## 2021-06-10 DIAGNOSIS — I10 HYPERTENSION, UNSPECIFIED TYPE: ICD-10-CM

## 2021-06-11 RX ORDER — LISINOPRIL 20 MG/1
TABLET ORAL
Qty: 90 TABLET | Refills: 0 | Status: SHIPPED | OUTPATIENT
Start: 2021-06-11 | End: 2021-09-02

## 2021-06-12 NOTE — TELEPHONE ENCOUNTER
Chantix starter pack pending for PCP review.    Requested Prescriptions     Pending Prescriptions Disp Refills     varenicline (CHANTIX STARTING MONTH BOX) 0.5 mg (11)- 1 mg (42) tablet 53 tablet 0     Si wk before you stop smoking take 0.5mg daily on days 1-3, 0.5mg 2 times each day on days 4-7, then 1mg 2 times daily.     varenicline (CHANTIX) 1 mg tablet 60 tablet 2     Sig: Take 1 tab by mouth two times a day. Take after eating with a full glass of water. NOTE: Dispense as maintenance for refills only.

## 2021-06-13 NOTE — TELEPHONE ENCOUNTER
Refill Approved    Rx renewed per Medication Renewal Policy. Medication was last renewed on 1/22/20.    Jade Tejada, [facility] Triage/Med Refill 12/21/2020     Requested Prescriptions   Pending Prescriptions Disp Refills     lisinopriL (PRINIVIL,ZESTRIL) 20 MG tablet 90 tablet 3     Sig: Take 1 tablet (20 mg total) by mouth daily.       Ace Inhibitors Refill Protocol Passed - 12/17/2020  1:10 PM        Passed - PCP or prescribing provider visit in past 12 months       Last office visit with prescriber/PCP: 2/26/2020 Brett Groves MD OR same dept: 2/26/2020 Brett Groves MD OR same specialty: 2/26/2020 Brett Groves MD  Last physical: 1/22/2020 Last MTM visit: Visit date not found   Next visit within 3 mo: Visit date not found  Next physical within 3 mo: Visit date not found  Prescriber OR PCP: Brett Groves MD  Last diagnosis associated with med order: 1. Hypertension, unspecified type  - lisinopriL (PRINIVIL,ZESTRIL) 20 MG tablet; Take 1 tablet (20 mg total) by mouth daily.  Dispense: 90 tablet; Refill: 3    If protocol passes may refill for 12 months if within 3 months of last provider visit (or a total of 15 months).             Passed - Serum Potassium in past 12 months     Lab Results   Component Value Date    Potassium 4.4 02/26/2020             Passed - Blood pressure filed in past 12 months     BP Readings from Last 1 Encounters:   02/26/20 118/77             Passed - Serum Creatinine in past 12 months     Creatinine   Date Value Ref Range Status   02/26/2020 0.94 0.70 - 1.30 mg/dL Final

## 2021-06-15 NOTE — TELEPHONE ENCOUNTER
Refill Approved    Rx renewed per Medication Renewal Policy. Medication was last renewed on 2/26/20.    Brett Blake, [facility] Triage/Med Refill 2/24/2021     Requested Prescriptions   Pending Prescriptions Disp Refills     hydroCHLOROthiazide (MICROZIDE) 12.5 mg capsule [Pharmacy Med Name: HYDROCHLOROTHIAZIDE 12.5 MG CP] 90 capsule 3     Sig: TAKE 1 CAPSULE BY MOUTH EVERY DAY       Diuretics/Combination Diuretics Refill Protocol  Passed - 2/24/2021 12:35 AM        Passed - Visit with PCP or prescribing provider visit in past 12 months     Last office visit with prescriber/PCP: 2/26/2020 Brett Groves MD OR same dept: 2/26/2020 Brett Groves MD OR same specialty: 2/26/2020 Brett Groves MD  Last physical: 1/22/2020 Last MTM visit: Visit date not found   Next visit within 3 mo: Visit date not found  Next physical within 3 mo: Visit date not found  Prescriber OR PCP: Brett Groves MD  Last diagnosis associated with med order: 1. Benign essential hypertension  - hydroCHLOROthiazide (MICROZIDE) 12.5 mg capsule [Pharmacy Med Name: HYDROCHLOROTHIAZIDE 12.5 MG CP]; TAKE 1 CAPSULE BY MOUTH EVERY DAY  Dispense: 90 capsule; Refill: 3    If protocol passes may refill for 12 months if within 3 months of last provider visit (or a total of 15 months).             Passed - Serum Potassium in past 12 months      Lab Results   Component Value Date    Potassium 4.4 02/26/2020             Passed - Serum Sodium in past 12 months      Lab Results   Component Value Date    Sodium 136 02/26/2020             Passed - Blood pressure on file in past 12 months     BP Readings from Last 1 Encounters:   02/26/20 118/77             Passed - Serum Creatinine in past 12 months      Creatinine   Date Value Ref Range Status   02/26/2020 0.94 0.70 - 1.30 mg/dL Final

## 2021-06-16 PROBLEM — I10 BENIGN ESSENTIAL HYPERTENSION: Status: ACTIVE | Noted: 2020-01-29

## 2021-06-16 NOTE — TELEPHONE ENCOUNTER
RN cannot approve Refill Request    RN can NOT refill this medication PCP messaged that patient is overdue for Labs and Office Visit. Last office visit: 2/26/2020 Brett Groves MD Last Physical: 1/22/2020 Last MTM visit: Visit date not found Last visit same specialty: 2/26/2020 Brett Groves MD.  Next visit within 3 mo: Visit date not found  Next physical within 3 mo: Visit date not found      Christina Núñez, [facility] Triage/Med Refill 3/16/2021    Requested Prescriptions   Pending Prescriptions Disp Refills     lisinopriL (PRINIVIL,ZESTRIL) 20 MG tablet [Pharmacy Med Name: LISINOPRIL 20 MG TABLET] 90 tablet 0     Sig: TAKE 1 TABLET BY MOUTH EVERY DAY       Ace Inhibitors Refill Protocol Failed - 3/16/2021  1:34 AM        Failed - PCP or prescribing provider visit in past 12 months       Last office visit with prescriber/PCP: 2/26/2020 Brett Groves MD OR same dept: Visit date not found OR same specialty: 2/26/2020 Brett Groves MD  Last physical: 1/22/2020 Last MTM visit: Visit date not found   Next visit within 3 mo: Visit date not found  Next physical within 3 mo: Visit date not found  Prescriber OR PCP: Brett Groves MD  Last diagnosis associated with med order: 1. Hypertension, unspecified type  - lisinopriL (PRINIVIL,ZESTRIL) 20 MG tablet [Pharmacy Med Name: LISINOPRIL 20 MG TABLET]; TAKE 1 TABLET BY MOUTH EVERY DAY  Dispense: 90 tablet; Refill: 0    If protocol passes may refill for 12 months if within 3 months of last provider visit (or a total of 15 months).             Failed - Serum Potassium in past 12 months     No results found for: LN-POTASSIUM          Failed - Blood pressure filed in past 12 months     BP Readings from Last 1 Encounters:   02/26/20 118/77             Failed - Serum Creatinine in past 12 months     Creatinine   Date Value Ref Range Status   02/26/2020 0.94 0.70 - 1.30 mg/dL Final

## 2021-06-17 NOTE — TELEPHONE ENCOUNTER
1st attempt: LVM for patient to call us back to get scheduled for a medication check appointment and for some fasting labs

## 2021-06-17 NOTE — TELEPHONE ENCOUNTER
RN cannot approve Refill Request    RN can NOT refill this medication PCP messaged that patient is overdue for Labs and Office Visit. Last office visit: 2/26/2020 Brett Groves MD Last Physical: 1/22/2020 Last MTM visit: Visit date not found Last visit same specialty: 2/26/2020 Brett Groves MD.  Next visit within 3 mo: Visit date not found  Next physical within 3 mo: Visit date not found      Christina Núñez, [facility] Triage/Med Refill 5/24/2021    Requested Prescriptions   Pending Prescriptions Disp Refills     hydroCHLOROthiazide (MICROZIDE) 12.5 mg capsule [Pharmacy Med Name: HYDROCHLOROTHIAZIDE 12.5 MG CP] 90 capsule 0     Sig: TAKE 1 CAPSULE BY MOUTH EVERY DAY       Diuretics/Combination Diuretics Refill Protocol  Failed - 5/24/2021  8:03 AM        Failed - Visit with PCP or prescribing provider visit in past 12 months     Last office visit with prescriber/PCP: 2/26/2020 Brett Groves MD OR same dept: Visit date not found OR same specialty: 2/26/2020 Brett Groves MD  Last physical: 1/22/2020 Last MTM visit: Visit date not found   Next visit within 3 mo: Visit date not found  Next physical within 3 mo: Visit date not found  Prescriber OR PCP: Brett Groves MD  Last diagnosis associated with med order: 1. Benign essential hypertension  - hydroCHLOROthiazide (MICROZIDE) 12.5 mg capsule [Pharmacy Med Name: HYDROCHLOROTHIAZIDE 12.5 MG CP]; TAKE 1 CAPSULE BY MOUTH EVERY DAY  Dispense: 90 capsule; Refill: 0    If protocol passes may refill for 12 months if within 3 months of last provider visit (or a total of 15 months).             Failed - Serum Potassium in past 12 months      No results found for: LN-POTASSIUM          Failed - Serum Sodium in past 12 months      No results found for: LN-SODIUM          Failed - Blood pressure on file in past 12 months     BP Readings from Last 1 Encounters:   02/26/20 118/77             Failed - Serum Creatinine in  past 12 months      Creatinine   Date Value Ref Range Status   02/26/2020 0.94 0.70 - 1.30 mg/dL Final

## 2021-06-17 NOTE — TELEPHONE ENCOUNTER
RN cannot approve Refill Request    RN can NOT refill this medication PCP messaged that patient is overdue for Office Visit. Last office visit: 2/26/2020 Brett Groves MD Last Physical: 1/22/2020 Last MTM visit: Visit date not found Last visit same specialty: 2/26/2020 Brett Groves MD.  Next visit within 3 mo: Visit date not found  Next physical within 3 mo: Visit date not found      Christina Núñez, [facility] Triage/Med Refill 5/23/2021    Requested Prescriptions   Pending Prescriptions Disp Refills     hydroCHLOROthiazide (MICROZIDE) 12.5 mg capsule  0     Sig: Take by mouth daily.       Diuretics/Combination Diuretics Refill Protocol  Failed - 5/23/2021  8:39 AM        Failed - Visit with PCP or prescribing provider visit in past 12 months     Last office visit with prescriber/PCP: 2/26/2020 Brett Groves MD OR same dept: Visit date not found OR same specialty: 2/26/2020 Brett Groves MD  Last physical: 1/22/2020 Last MTM visit: Visit date not found   Next visit within 3 mo: Visit date not found  Next physical within 3 mo: Visit date not found  Prescriber OR PCP: Brett Groves MD  Last diagnosis associated with med order: 1. Benign essential hypertension  - hydroCHLOROthiazide (MICROZIDE) 12.5 mg capsule; Take by mouth daily.; Refill: 0    If protocol passes may refill for 12 months if within 3 months of last provider visit (or a total of 15 months).             Failed - Serum Potassium in past 12 months      No results found for: LN-POTASSIUM          Failed - Serum Sodium in past 12 months      No results found for: LN-SODIUM          Failed - Blood pressure on file in past 12 months     BP Readings from Last 1 Encounters:   02/26/20 118/77             Failed - Serum Creatinine in past 12 months      Creatinine   Date Value Ref Range Status   02/26/2020 0.94 0.70 - 1.30 mg/dL Final

## 2021-06-20 NOTE — LETTER
Letter by Brett Groves MD at      Author: Brett Groves MD Service: -- Author Type: --    Filed:  Encounter Date: 1/23/2020 Status: (Other)         Gualberto Wood  [id number] [place] [id number]             January 24, 2020         Dear Mr. Wood,    Below are the results from your recent visit:    Resulted Orders   PSA, Annual Screen (Prostatic-Specific Antigen)   Result Value Ref Range    PSA 3.1 0.0 - 4.5 ng/mL    Narrative    Method is Abbott Prostate-Specific Antigen (PSA)  Standard-WHO 1st International (90:10)   Comprehensive Metabolic Panel   Result Value Ref Range    Sodium 138 136 - 145 mmol/L    Potassium 5.0 3.5 - 5.0 mmol/L    Chloride 104 98 - 107 mmol/L    CO2 23 22 - 31 mmol/L    Anion Gap, Calculation 11 5 - 18 mmol/L    Glucose 104 70 - 125 mg/dL    BUN 16 8 - 22 mg/dL    Creatinine 0.91 0.70 - 1.30 mg/dL    GFR MDRD Af Amer >60 >60 mL/min/1.73m2    GFR MDRD Non Af Amer >60 >60 mL/min/1.73m2    Bilirubin, Total 0.7 0.0 - 1.0 mg/dL    Calcium 9.2 8.5 - 10.5 mg/dL    Protein, Total 7.0 6.0 - 8.0 g/dL    Albumin 4.0 3.5 - 5.0 g/dL    Alkaline Phosphatase 77 45 - 120 U/L    AST 41 (H) 0 - 40 U/L    ALT 86 (H) 0 - 45 U/L    Narrative    Fasting Glucose reference range is 70-99 mg/dL per  American Diabetes Association (ADA) guidelines.   Lipid [place] FASTING   Result Value Ref Range    Cholesterol 225 (H) <=199 mg/dL    Triglycerides 185 (H) <=149 mg/dL    HDL Cholesterol 32 (L) >=40 mg/dL    LDL Calculated 156 (H) <=129 mg/dL    Patient Fasting > 8hrs? Yes    Vitamin D, Total (25-Hydroxy)   Result Value Ref Range    Vitamin D, Total (25-Hydroxy) 22.2 (L) 30.0 - 80.0 ng/mL    Narrative    Deficiency <10.0 ng/mL  Insufficiency 10.0-29.9 ng/mL  Sufficiency 30.0-80.0 ng/mL  Toxicity (possible) >100.0 ng/mL       Hi Soham:  I just left you a phone message with the info below.  Your PSA has been slowly increasing (within the normal range and by acceptable amounts).   I would suggest a  repeat PSA in one year.    The liver enzymes are mildly elevated.   Work on weight loss  To get your weight as close to 200 lbs as possible.  In the meantime you should avoid alcohol.  I would suggest repeating liver enzymes in 3 months and have entered a future lab order for this.   Just set up a lab appt in 3 months.    Your Vit D  Level is slightly low.  If you do not currently take Vit D then I would suggest you take 2000 units daily.    Lastly your cholesterol and LDL are moderately elevated. (see below)  There is a new model to calculate your 10 year Cardiovascular Disease  risk.  Using this model which takes into account age, gender, race, systolic blood pressure, cholesterol and HDL, and the presence or absence of diabetes, use of blood pressure meds or nicotine, your risk is shown below.    Your risk of having a cardiovascular event over the next 10 years is 16 %.   When this risk exceeds 7.5% one may benefit from a statin medication to lower the cholesterol.    If you start a statin then you would require close monitoring of your liver functions with the first check being 6 weeks after starting the statin.  Also you would need to monitor for any muscle soreness or pain, something that statins can sometimes cause.  If you are interested in initiating a statin medication please notify us and we can have this called to your pharmacy.    Your remaining labs are normal.    Please call with questions or contact us using [employer].    Sincerely,        Electronically signed by Brett Groves MD

## 2021-06-20 NOTE — LETTER
Letter by Brett Groves MD at      Author: Brett Groves MD Service: -- Author Type: --    Filed:  Encounter Date: 2/25/2020 Status: (Other)         Gualberto Wood  [id number] [place] [id number]             February 25, 2020         Dear Mr. Wood,    Below are the results from your recent visit:    Resulted Orders   CT Low Dose Lung Screening Chest    Narrative    EXAM: LOW DOSE LUNG CANCER SCREENING CT CHEST  LOCATION: [facility]  DATE/TIME: 2/25/2020 1:44 PM    INDICATION: Lung cancer screening. History of smoking. High risk patient with greater than 30 pack year smoking history.  COMPARISON: None.    TECHNIQUE: Low-dose lung cancer screening non-contrast CT chest. Dose reduction techniques were used.     FINDINGS:  NODULES: Tiny benign calcified nodule in the lateral right lower lobe, less than 2 mm, benign. Similar punctate nodule in the posterior left lower lobe (series 4, image 164). A nodule of similar size which is not definitively calcified resides in the   lateral left lower lobe (series 4, image 170). No subsolid or    Groundglass attenuation nodules.    LUNGS AND PLEURA: Symmetric lung inflation. No consolidative opacities. Mild diffuse airway wall thickening. Transverse narrowing of the trachea. Upper zone predominant emphysema. No pleural space abnormality.    MEDIASTINUM: Cardiac chambers are normal in size. No pericardial effusion. Degenerative thickening and calcification of the aortic leaflets, mild. Normal caliber thoracic aorta. Mild atheromatous aortic arch calcification.    No enlarged mediastinal or hilar lymph nodes.    CORONARY ARTERY CALCIFICATION: Mild.    LIMITED UPPER ABDOMEN: Small, diffusely nodular liver. The spleen is not enlarged. No upper abdominal ascites or gastroesophageal varices.    MUSCULOSKELETAL: Normal.      Impression    1.  Negative for lung cancer screening purposes.  2.  Cirrhotic morphology of the liver.    LungRADS CATEGORY: 2:  "Benign.  (<1% risk of malignancy)  -Perifissural nodule(s): <10 mm  -Solid nodule(s): <6 mm on baseline screening; or new nodule <4 mm  -Subsolid nodule(s): <6 mm on baseline screening  -Ground glass nodule(s): <30 mm; or greater than or equal to 30 mm and unchanged or slowly growing  -Category 3 or 4 nodules that are unchanged for greater than or equal to 3 months    RADIOLOGIST RECOMMENDATION: Continue annual screening with low-dose CT chest in 12 months .       Chito Burciaga:  Your Chest CT is \"NEGATIVE for lung cancer screening purposes\"  There are a few very tiny nodules and a one year follow up CT is recommended.    Please call with questions or contact us using [employer].    Sincerely,        Electronically signed by Brett Groves MD       "

## 2021-06-20 NOTE — LETTER
"Letter by Brett Groves MD at      Author: Brett Groves MD Service: -- Author Type: --    Filed:  Encounter Date: 2/26/2020 Status: (Other)         Gualberto Wood  [id number] [place] [id number]             February 26, 2020         Dear Mr. Wood,    Below are the results from your recent visit:    Resulted Orders   Basic Metabolic Panel   Result Value Ref Range    Sodium 136 136 - 145 mmol/L    Potassium 4.4 3.5 - 5.0 mmol/L    Chloride 102 98 - 107 mmol/L    CO2 25 22 - 31 mmol/L    Anion Gap, Calculation 9 5 - 18 mmol/L    Glucose 107 70 - 125 mg/dL    Calcium 9.5 8.5 - 10.5 mg/dL    BUN 15 8 - 22 mg/dL    Creatinine 0.94 0.70 - 1.30 mg/dL    GFR MDRD Af Amer >60 >60 mL/min/1.73m2    GFR MDRD Non Af Amer >60 >60 mL/min/1.73m2    Narrative    Fasting Glucose reference range is 70-99 mg/dL per  American Diabetes Association (ADA) guidelines.       Chito Rousseau;  Your potassium and kidney function have remained NORMAL.  The fasting blood sugar has just crossed the border into the \"pre-diabetic\" range (105-125).  Continue to work on weight reduction through a diet low in carbohydrates.    Please call with questions or contact us using [employer].    Sincerely,        Electronically signed by Brett Groves MD       "

## 2021-06-20 NOTE — LETTER
Letter by Brett Groves MD at      Author: Brett Groves MD Service: -- Author Type: --    Filed:  Encounter Date: 1/23/2020 Status: (Other)         Gualberto Wood  [id number] [place] [id number]             January 23, 2020         Dear Mr. Wood,    Below are the results from your recent visit:    Resulted Orders   PSA, Annual Screen (Prostatic-Specific Antigen)   Result Value Ref Range    PSA 3.1 0.0 - 4.5 ng/mL    Narrative    Method is Abbott Prostate-Specific Antigen (PSA)  Standard-WHO 1st International (90:10)   Comprehensive Metabolic Panel   Result Value Ref Range    Sodium 138 136 - 145 mmol/L    Potassium 5.0 3.5 - 5.0 mmol/L    Chloride 104 98 - 107 mmol/L    CO2 23 22 - 31 mmol/L    Anion Gap, Calculation 11 5 - 18 mmol/L    Glucose 104 70 - 125 mg/dL    BUN 16 8 - 22 mg/dL    Creatinine 0.91 0.70 - 1.30 mg/dL    GFR MDRD Af Amer >60 >60 mL/min/1.73m2    GFR MDRD Non Af Amer >60 >60 mL/min/1.73m2    Bilirubin, Total 0.7 0.0 - 1.0 mg/dL    Calcium 9.2 8.5 - 10.5 mg/dL    Protein, Total 7.0 6.0 - 8.0 g/dL    Albumin 4.0 3.5 - 5.0 g/dL    Alkaline Phosphatase 77 45 - 120 U/L    AST 41 (H) 0 - 40 U/L    ALT 86 (H) 0 - 45 U/L    Narrative    Fasting Glucose reference range is 70-99 mg/dL per  American Diabetes Association (ADA) guidelines.   Lipid [place] FASTING   Result Value Ref Range    Cholesterol 225 (H) <=199 mg/dL    Triglycerides 185 (H) <=149 mg/dL    HDL Cholesterol 32 (L) >=40 mg/dL    LDL Calculated 156 (H) <=129 mg/dL    Patient Fasting > 8hrs? Yes    Vitamin D, Total (25-Hydroxy)   Result Value Ref Range    Vitamin D, Total (25-Hydroxy) 22.2 (L) 30.0 - 80.0 ng/mL    Narrative    Deficiency <10.0 ng/mL  Insufficiency 10.0-29.9 ng/mL  Sufficiency 30.0-80.0 ng/mL  Toxicity (possible) >100.0 ng/mL           Please call with questions or contact us using [email or web address].    Sincerely,        Electronically signed by Brett Groves MD

## 2021-06-20 NOTE — LETTER
Letter by Brett Groves MD at      Author: Brett Groves MD Service: -- Author Type: --    Filed:  Encounter Date: 4/17/2020 Status: (Other)         Gualberto Wood  [address] [place] [address]             April 17, 2020         Dear Mr. Wood,    Below are the results from your recent visit:    Resulted Orders   Hepatic Profile   Result Value Ref Range    Bilirubin, Total 0.9 0.0 - 1.0 mg/dL    Bilirubin, Direct 0.3 <=0.5 mg/dL    Protein, Total 7.5 6.0 - 8.0 g/dL    Albumin 4.2 3.5 - 5.0 g/dL    Alkaline Phosphatase 75 45 - 120 U/L    AST 47 (H) 0 - 40 U/L     (H) 0 - 45 U/L       Chito Rousseau:  As we discussed the one liver enzyme called the ALT has risen a bit more.  This is most likely related to fatty infiltration of the liver.  Work on weight loss with a weight goal of 200 lbs.  Avoid alcohol completely.  Let's recheck the liver enzymes in 3 months via a lab appt.  Stay well!    Please call with questions or contact us using [employer].    Sincerely,        Electronically signed by Brett Groves MD

## 2021-06-21 NOTE — PROGRESS NOTES
ASSESSMENT:   1. Annual physical exam  Lipid [place]    Comprehensive Metabolic Panel    PSA, Annual Screen (Prostatic-Specific Antigen)    Glycosylated Hemoglobin A1c    Ambulatory referral to Dermatology   2. Screen for colon cancer  Ambulatory referral for Colonoscopy   3. Essential Hypertriglyceridemia  Lipid [place]   4. Elevated blood-pressure reading without diagnosis of hypertension     5. Encounter for smoking cessation counseling  varenicline (CHANTIX STARTING MONTH BOX) 0.5 mg (11)- 1 mg (42) tablet    varenicline (CHANTIX) 1 mg tablet       healthy adult male    Calculated ASCVD risk score with the patient and explained to him that he may be need for statin as he smokes.  Spent time discussing elevated blood pressure.  He was going to check it at home and then return with numbers.  I have advised him to follow in 1 month.  About 3-10 minutes spent discussing with the patient about quitting smoking and counseling.  Chantix was initiated today.    PLAN:       quit smoking, begin progressive daily aerobic exercise program, follow a low fat, low cholesterol diet, attempt to lose weight, continue current healthy lifestyle patterns and return for routine annual checkups          SUBJECTIVE:   Chief Complaint   Patient presents with     Annual Exam     Fasting labs     Skin Problem     Check dark mole type spots located on back     Nicotine Dependence     Would like to discuss smoking cessation     Flu Vaccine       patient  is a 63 y.o.  male  presenting for his annual checkup.  Noted elevated blood pressure.    No Known Allergies    Current Outpatient Medications on File Prior to Visit   Medication Sig Dispense Refill     aspirin 81 MG EC tablet Take 81 mg by mouth daily.       DOCOSAHEXANOIC ACID/EPA (FISH OIL ORAL) Take 1 capsule by mouth daily.       VITAMIN B COMPLEX ORAL Take 1 tablet by mouth daily.       vitamin E acetate (VITAMIN E ORAL) Take 1 tablet by mouth every other day.       No current  facility-administered medications on file prior to visit.        No past medical history on file.    Past Surgical History:   Procedure Laterality Date     bowel obstruction  10/2012     [place] KNEE SCOPE,DIAGNOSTIC      Description: Arthroscopy Knee Left;  Recorded: 10/25/2010;  Comments: ACL reconstruction in 1991, repair of meniscus 10/2010       History reviewed. No pertinent family history.    Social History     Socioeconomic History     Marital status:      Spouse name: None     Number of children: None     Years of education: None     Highest education level: None   Social Needs     Financial resource strain: None     Food insecurity - worry: None     Food insecurity - inability: None     Transportation needs - medical: None     Transportation needs - non-medical: None   Occupational History     None   Tobacco Use     Smoking status: Current Every Day Smoker     Packs/day: 0.75     Types: Cigarettes     Smokeless tobacco: Never Used   Substance and Sexual Activity     Alcohol use: Yes     Comment: 4 drinks per wk     Drug use: No     Sexual activity: Yes     Partners: Female     Birth control/protection: Surgical     Comment: Vasectomy   Other Topics Concern     None   Social History Narrative    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    Lives with wife.    Has adult children.    Mehul Del Toro MD  11/6/2018            The 10-year ASCVD risk score (Faustino SIMONS Jr et al., 2013) is: 38.1%      Values used to calculate the score:        Age: 63 years        Sex: Male        Is Non- : No        Diabetic: No        Tobacco smoker: Yes        Systolic Blood Pressure: 188 mmHg        Is BP treated: No        HDL Cholesterol: 28 mg/dL        Total Cholesterol: 190 mg/dL    The 10-year ASCVD risk score (Faustino SIMONS Jr et al., 2013) is: 29.7%      Values used to calculate the score:        Age: 63 years        Sex: Male        Is Non- : No        Diabetic: No       "  Tobacco smoker: Yes        Systolic Blood Pressure: 158 mmHg        Is BP treated: No        HDL Cholesterol: 28 mg/dL        Total Cholesterol: 190 mg/dL       Healthy Habits:   Regular Exercise: No  Sunscreen Use: Yes  Healthy Diet: Yes  Dental Visits Regularly: Yes  Seat Belt: Yes  Sexually active: Yes  Monthly Self Testicular Exams:  No  Hemoccults: No  Flex Sig: No  Colonoscopy: Yes and 2007  Lipid Profile: Yes  Glucose Screen: Yes  Prevention of Osteoporosis: N/A  Last Dexa: N/A  Guns at Home:  Yes  Guns Safety Locks:  Yes        ROS:  Feeling well. No dyspnea or chest pain on exertion. No abdominal pain, change in bowel habits, black or bloody stools. No urinary tract or prostatic symptoms. No neurological complaints.    OBJECTIVE:  BP (!) 158/97 (Patient Site: Left Arm, Patient Position: Sitting, Cuff Size: Adult Large)   Pulse 69   Temp 98.3  F (36.8  C) (Oral)   Resp 16   Ht 5' 10.28\" (1.785 m)   Wt (!) 239 lb (108.4 kg)   SpO2 95%   BMI 34.02 kg/m      The patient appears well, alert, oriented x 3, in no distress.   ENT normal.  Neck supple. No adenopathy or thyromegaly. ROQUE. Ears - TM's canal normal, Throat is normal  LUNGS: are clear, good air entry, no wheezes, rhonchi or rales. S1 and S2 normal, no murmurs, regular rate and rhythm.   ABDOMEN: :oft without tenderness, guarding, mass or organomegaly.   Extremities show no edema, normal peripheral pulses. Neurological is normal without focal findings.  SKIN: Warm and well perfused.  Moles noted on back. Varying  NEURO: Gait normal. Intact neuro  "

## 2021-06-21 NOTE — LETTER
Letter by Mica Hickman RN at      Author: Mica Hickman RN Service: -- Author Type: --    Filed:  Encounter Date: 2/24/2021 Status: (Other)         Gualberto Wood  [address] [place] [address]      02/24/21      Dear Gualberto,    It's time for your yearly exam to check for lung cancer.   Please call your primary care doctor to schedule a brief visit so your Low Dose CT scan can be ordered at the time of that appointment. Your scan needs to be done within 30 days of your office visit.      You should have a yearly exam if:  You're age 55 to 80 (55 to 77, if you're on Medicare)  You've smoked a pack a day for at least 30 years (or 2 packs a day for at least 15 years)  If you no longer smoke, it's been less than 15 years since you quit     These exams work best when done every year. They are good at finding lung cancer early, but they can't find all lung cancers. If you develop any symptoms (shortness of breath, chest pain, coughing up blood), call your doctor right away.     If you would like help to quit smoking, please talk with your doctor during your next visit. Or, call QUITPLAN at [phone].        Sincerely,  [facility] ([facility]) Lung Cancer Screening Program

## 2021-06-22 NOTE — PROGRESS NOTES
Assessment:     1. Hypertension, unspecified type  lisinopril (PRINIVIL,ZESTRIL) 10 MG tablet   2. Dyslipidemia     3. Nicotine Dependence          Hypertension, stage 2 . Evidence of target organ damage: none.    Dyslipidemia: Discussed about need to start a statin with risk of smoking and hypertension.  ASCVD risk discussed.  We will consider it.  About 3-5 minutes spent counseling on smoking which will be main contributor to his atherosclerotic risk.  He has cut down on smoking         Plan:      Dietary sodium restriction.  Regular aerobic exercise.      Patient Instructions   Keep a check of blood pressure outside the clinic 1-2 times per week and return for clinic visit in 4 weeks time.  Sooner if most blood pressure readings are greater than 140/ 80mmHg     Inform me via my chart in 2 weeks and I will increase the dose of medications.    Also seek help immediately if you have  any symptoms referable to  elevated blood pressure, specifically chest pain, palpitations, shortness of breath or swelling in the legs.    Continue on quitting smoking.    Mehul Del Toro MD  12/4/2018                  Subjective:     Chief Complaint   Patient presents with     Follow-up     Pt here today for 1 month follow up of BP        Patient here for follow-up of elevated blood pressure.  He is exercising and is adherent to a low-salt diet.  Blood pressure NOT checking BP at home well controlled at home. Cardiac symptoms: none. Patient denies: chest pain, chest pressure/discomfort, dyspnea, fatigue, irregular heart beat, lower extremity edema and palpitations. Cardiovascular risk factors: advanced age (older than 55 for men, 65 for women), dyslipidemia, hypertension, male gender, obesity (BMI >= 30 kg/m2) and smoking/ tobacco exposure. Use of agents associated with hypertension: none. History of target organ damage: none.    The following portions of the patient's history were reviewed and updated as appropriate: allergies,  current medications, past family history, past medical history, past social history, past surgical history and problem list.  No Known Allergies    Current Outpatient Medications on File Prior to Visit   Medication Sig Dispense Refill     aspirin 81 MG EC tablet Take 81 mg by mouth daily.       DOCOSAHEXANOIC ACID/EPA (FISH OIL ORAL) Take 1 capsule by mouth daily.       varenicline (CHANTIX STARTING MONTH BOX) 0.5 mg (11)- 1 mg (42) tablet 1 wk before you stop smoking take 0.5mg daily on days 1-3, 0.5mg 2 times each day on days 4-7, then 1mg 2 times daily. 53 tablet 0     varenicline (CHANTIX) 1 mg tablet Take 1 tab by mouth two times a day. Take after eating with a full glass of water. NOTE: Dispense as maintenance for refills only. 60 tablet 2     VITAMIN B COMPLEX ORAL Take 1 tablet by mouth daily.       vitamin E acetate (VITAMIN E ORAL) Take 1 tablet by mouth every other day.       No current facility-administered medications on file prior to visit.        Patient Active Problem List   Diagnosis     Essential Hypertriglyceridemia     Nicotine Dependence     Benign Adenomatous Polyp Of The Large Intestine     Complete Tear Of The Anterior Cruciate Ligament Of The Knee     Obesity       History reviewed. No pertinent past medical history.    Past Surgical History:   Procedure Laterality Date     bowel obstruction  10/2012     [place] KNEE SCOPE,DIAGNOSTIC      Description: Arthroscopy Knee Left;  Recorded: 10/25/2010;  Comments: ACL reconstruction in 1991, repair of meniscus 10/2010       History reviewed. No pertinent family history.    Social History     Socioeconomic History     Marital status:      Spouse name: None     Number of children: None     Years of education: None     Highest education level: None   Social Needs     Financial resource strain: None     Food insecurity - worry: None     Food insecurity - inability: None     Transportation needs - medical: None     Transportation needs - non-medical:  None   Occupational History     None   Tobacco Use     Smoking status: Current Every Day Smoker     Packs/day: 0.75     Types: Cigarettes     Smokeless tobacco: Never Used   Substance and Sexual Activity     Alcohol use: Yes     Comment: 4 drinks per wk     Drug use: No     Sexual activity: Yes     Partners: Female     Birth control/protection: Surgical     Comment: Vasectomy   Other Topics Concern     None   Social History Narrative    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    Lives with wife.    Has adult children.    Mehul Del Toro MD  11/6/2018            The 10-year ASCVD risk score (Faustino SIMONS Jr, et al., 2013) is: 38.1%      Values used to calculate the score:        Age: 63 years        Sex: Male        Is Non- : No        Diabetic: No        Tobacco smoker: Yes        Systolic Blood Pressure: 188 mmHg        Is BP treated: No        HDL Cholesterol: 28 mg/dL        Total Cholesterol: 190 mg/dL    The 10-year ASCVD risk score (Faustino SIMONS Jr et al., 2013) is: 29.7%      Values used to calculate the score:        Age: 63 years        Sex: Male        Is Non- : No        Diabetic: No        Tobacco smoker: Yes        Systolic Blood Pressure: 158 mmHg        Is BP treated: No        HDL Cholesterol: 28 mg/dL        Total Cholesterol: 190 mg/dL       Review of Systems  A 12 point comprehensive review of systems was negative except as noted.        Objective:     /89   Pulse (!) 59   Resp 20   Wt (!) 241 lb 6.4 oz (109.5 kg)   SpO2 95%   BMI 34.37 kg/m    GENERAL APPEARANCE:  Appearing stated age, smiling, alert, cooperative, and in no acute distress.   HEENT: Pupils equal, regular, react to light and accommodation. Extraocular muscles intact, fundi benign. Ear canals and tympanic membranes are normal. Lips, mouth, and throat are unremarkable.   NECK: Neck supple without adenopathy, thyromegaly or masses.   LYMPH: No anterior cervical or  supraclavicular LN enlargement   PULMONARY: Normal respiratory effort. Chest is clear.   CARDIOVASCULAR: Heart auscultation: rhythm regular, heart sounds normal S1 and S2, bruit carotid artery absent.   SKIN: Warm and well perfused..   MUSCULOSKELETAL: No obvious joint swelling, deformity or limitation in range of motion, full range of motion of the back and neck without pain, strength normal and symmetric in all muscle groups.   EXTREMITIES: Peripheral pulses are full. Extremities with no edema.   MENTAL STATUS: Alert, oriented and thought content appropriate   NEUROLOGIC: Station and gait normal, strength and movement normal, reflexes are normal and symmetric

## 2021-06-24 ENCOUNTER — AMBULATORY - HEALTHEAST (OUTPATIENT)
Dept: FAMILY MEDICINE | Facility: CLINIC | Age: 66
End: 2021-06-24

## 2021-06-24 ENCOUNTER — COMMUNICATION - HEALTHEAST (OUTPATIENT)
Dept: FAMILY MEDICINE | Facility: CLINIC | Age: 66
End: 2021-06-24

## 2021-06-24 DIAGNOSIS — R97.20 ELEVATED PROSTATE SPECIFIC ANTIGEN (PSA): ICD-10-CM

## 2021-06-25 NOTE — TELEPHONE ENCOUNTER
Second Attempt: I sent a my chart message to the patient to please contact our office to schedule a med check appt with fasting lab work.

## 2021-06-25 NOTE — TELEPHONE ENCOUNTER
RN cannot approve Refill Request    RN can NOT refill this medication Protocol failed and NO refill given. Last office visit: Visit date not found Last Physical: Visit date not found Last MTM visit: Visit date not found Last visit same specialty: 2/26/2020 Brett Groves MD.  Next visit within 3 mo: Visit date not found  Next physical within 3 mo: Visit date not found      Brett Blake, [facility] Connection Triage/Med Refill 6/11/2021    Requested Prescriptions   Pending Prescriptions Disp Refills     lisinopriL (PRINIVIL,ZESTRIL) 20 MG tablet [Pharmacy Med Name: LISINOPRIL 20 MG TABLET] 90 tablet 0     Sig: TAKE 1 TABLET BY MOUTH EVERY DAY       Ace Inhibitors Refill Protocol Failed - 6/10/2021 12:13 AM        Failed - PCP or prescribing provider visit in past 12 months       Last office visit with prescriber/PCP: Visit date not found OR same dept: Visit date not found OR same specialty: 2/26/2020 Brett Groves MD  Last physical: Visit date not found Last MTM visit: Visit date not found   Next visit within 3 mo: Visit date not found  Next physical within 3 mo: Visit date not found  Prescriber OR PCP: uTcker Delvalle MD  Last diagnosis associated with med order: 1. Hypertension, unspecified type  - lisinopriL (PRINIVIL,ZESTRIL) 20 MG tablet [Pharmacy Med Name: LISINOPRIL 20 MG TABLET]; TAKE 1 TABLET BY MOUTH EVERY DAY  Dispense: 90 tablet; Refill: 0    If protocol passes may refill for 12 months if within 3 months of last provider visit (or a total of 15 months).             Failed - Serum Potassium in past 12 months     No results found for: LN-POTASSIUM          Failed - Blood pressure filed in past 12 months     BP Readings from Last 1 Encounters:   02/26/20 118/77             Failed - Serum Creatinine in past 12 months     Creatinine   Date Value Ref Range Status   02/26/2020 0.94 0.70 - 1.30 mg/dL Final

## 2021-06-26 ENCOUNTER — HEALTH MAINTENANCE LETTER (OUTPATIENT)
Age: 66
End: 2021-06-26

## 2021-06-26 NOTE — TELEPHONE ENCOUNTER
Left message regarding an elevated PSA test and the need to consult with Urology.  I will place a referral to Urology (Dr Serrato) at [phone].

## 2021-07-01 ENCOUNTER — COMMUNICATION - HEALTHEAST (OUTPATIENT)
Dept: FAMILY MEDICINE | Facility: CLINIC | Age: 66
End: 2021-07-01

## 2021-07-01 DIAGNOSIS — I10 BENIGN ESSENTIAL HYPERTENSION: ICD-10-CM

## 2021-07-01 RX ORDER — HYDROCHLOROTHIAZIDE 12.5 MG/1
CAPSULE ORAL
Qty: 90 CAPSULE | Refills: 3 | Status: SHIPPED | OUTPATIENT
Start: 2021-07-01 | End: 2022-07-06

## 2021-07-03 NOTE — ADDENDUM NOTE
Addendum Note by Augustin Del Toro MD at 11/6/2018  8:30 AM     Author: Augustin Del Toro MD Service: -- Author Type: Physician    Filed: 11/13/2018  6:39 PM Encounter Date: 11/6/2018 Status: Signed    : Augustin Del Toro MD (Physician)    Addended by: AUGUSTIN DEL TORO on: 11/13/2018 06:39 PM        Modules accepted: Orders

## 2021-07-03 NOTE — ADDENDUM NOTE
Addendum Note by Sayra Groves MD at 1/23/2020  5:19 PM     Author: Sayra Groves MD Service: -- Author Type: Physician    Filed: 1/24/2020  4:57 PM Encounter Date: 1/23/2020 Status: Signed    : Sayra Groves MD (Physician)    Addended by: SAYRA GROVES on: 1/24/2020 04:57 PM        Modules accepted: Orders

## 2021-07-04 NOTE — TELEPHONE ENCOUNTER
Telephone Encounter by Brett Blake, RN at 7/1/2021 11:15 AM     Author: Brett Blake RN Service: -- Author Type: Registered Nurse    Filed: 7/1/2021 11:15 AM Encounter Date: 7/1/2021 Status: Signed    : Brett Blake, RN (Registered Nurse)       Refill Approved    Rx renewed per Medication Renewal Policy. Medication was last renewed on 5/24/21.    Brett Blake, [facility] Connection Triage/Med Refill 7/1/2021     Requested Prescriptions   Pending Prescriptions Disp Refills   ? hydroCHLOROthiazide (MICROZIDE) 12.5 mg capsule [Pharmacy Med Name: HYDROCHLOROTHIAZIDE 12.5 MG CP] 30 capsule 0     Sig: TAKE 1 CAPSULE BY MOUTH EVERY DAY       Diuretics/Combination Diuretics Refill Protocol  Passed - 7/1/2021 10:30 AM        Passed - Visit with PCP or prescribing provider visit in past 12 months     Last office visit with prescriber/PCP: 6/23/2021 Brett Groves MD OR same dept: 6/23/2021 Brett Groves MD OR same specialty: 6/23/2021 Brett Groves MD  Last physical: 1/22/2020 Last MTM visit: Visit date not found   Next visit within 3 mo: Visit date not found  Next physical within 3 mo: Visit date not found  Prescriber OR PCP: Brett Groves MD  Last diagnosis associated with med order: 1. Benign essential hypertension  - hydroCHLOROthiazide (MICROZIDE) 12.5 mg capsule [Pharmacy Med Name: HYDROCHLOROTHIAZIDE 12.5 MG CP]; TAKE 1 CAPSULE BY MOUTH EVERY DAY  Dispense: 30 capsule; Refill: 0    If protocol passes may refill for 12 months if within 3 months of last provider visit (or a total of 15 months).             Passed - Serum Potassium in past 12 months      Lab Results   Component Value Date    Potassium 4.9 06/23/2021             Passed - Serum Sodium in past 12 months      Lab Results   Component Value Date    Sodium 139 06/23/2021             Passed - Blood pressure on file in past 12 months     BP Readings from Last 1 Encounters:   06/23/21 125/84             Passed - Serum  Creatinine in past 12 months      Creatinine   Date Value Ref Range Status   06/23/2021 0.86 0.70 - 1.30 mg/dL Final

## 2021-07-04 NOTE — LETTER
Letter by Brett Groves MD at      Author: Brett Groves MD Service: -- Author Type: --    Filed:  Encounter Date: 6/24/2021 Status: (Other)         Gualberto Wood  [address] [place] [address]             June 24, 2021         Dear Mr. Wood,    Below are the results from your recent visit:    Resulted Orders   Lipid [place] FASTING   Result Value Ref Range    Cholesterol 224 (H) <=199 mg/dL    Triglycerides 122 <=149 mg/dL    HDL Cholesterol 32 (L) >=40 mg/dL    LDL Calculated 168 (H) <=129 mg/dL    Patient Fasting > 8hrs? Yes    Comprehensive Metabolic Panel   Result Value Ref Range    Sodium 139 136 - 145 mmol/L    Potassium 4.9 3.5 - 5.0 mmol/L    Chloride 103 98 - 107 mmol/L    CO2 23 22 - 31 mmol/L    Anion Gap, Calculation 13 5 - 18 mmol/L    Glucose 104 70 - 125 mg/dL    BUN 19 8 - 22 mg/dL    Creatinine 0.86 0.70 - 1.30 mg/dL    GFR MDRD Af Amer >60 >60 mL/min/1.73m2    GFR MDRD Non Af Amer >60 >60 mL/min/1.73m2    Bilirubin, Total 0.8 0.0 - 1.0 mg/dL    Calcium 9.6 8.5 - 10.5 mg/dL    Protein, Total 7.0 6.0 - 8.0 g/dL    Albumin 4.1 3.5 - 5.0 g/dL    Alkaline Phosphatase 72 45 - 120 U/L    AST 24 0 - 40 U/L    ALT 32 0 - 45 U/L    Narrative    Fasting Glucose reference range is 70-99 mg/dL per  American Diabetes Association (ADA) guidelines.   PSA, Annual Screen (Prostatic-Specific Antigen)   Result Value Ref Range    PSA 6.1 (H) 0.0 - 4.5 ng/mL    Narrative    Method is Abbott Prostate-Specific Antigen (PSA)  Standard-WHO 1st International (90:10)       Chito Rousseau:  Your PSA has risen from 3.1 to 6.1.    It is important that you see Urology in consultation and further evaluation of this.  Please see Dr Serrato or Dr Elizondo ([phone]).  I have placed a referral and they should be reaching out to you but feel free to call them.    Also your cholesterol and LDL are moderately elevated.  If you are interested in starting a statin medication to lower this let me know and we can  further discuss this.    Your remaining labs are normal.    Please call with questions or contact us using [facility].    Sincerely,        Electronically signed by Brett Groves MD

## 2021-08-04 ENCOUNTER — TRANSFERRED RECORDS (OUTPATIENT)
Dept: HEALTH INFORMATION MANAGEMENT | Facility: CLINIC | Age: 66
End: 2021-08-04

## 2021-09-02 DIAGNOSIS — I10 HYPERTENSION, UNSPECIFIED TYPE: ICD-10-CM

## 2021-09-02 RX ORDER — LISINOPRIL 20 MG/1
20 TABLET ORAL DAILY
Qty: 90 TABLET | Refills: 3 | Status: SHIPPED | OUTPATIENT
Start: 2021-09-02 | End: 2022-09-10

## 2021-09-02 NOTE — TELEPHONE ENCOUNTER
"Last Written Prescription Date:  6/11/21  Last Fill Quantity: 90,  # refills: 0   Last office visit provider:  7/1/21     Requested Prescriptions   Pending Prescriptions Disp Refills     lisinopril (ZESTRIL) 20 MG tablet [Pharmacy Med Name: LISINOPRIL 20 MG TABLET] 90 tablet 0     Sig: TAKE 1 TABLET BY MOUTH EVERY DAY       ACE Inhibitors (Including Combos) Protocol Passed - 9/2/2021 12:25 AM        Passed - Blood pressure under 140/90 in past 12 months     BP Readings from Last 3 Encounters:   07/01/21 115/75   06/23/21 125/84   02/26/20 118/77                 Passed - Recent (12 mo) or future (30 days) visit within the authorizing provider's specialty     Patient has had an office visit with the authorizing provider or a provider within the authorizing providers department within the previous 12 mos or has a future within next 30 days. See \"Patient Info\" tab in inbasket, or \"Choose Columns\" in Meds & Orders section of the refill encounter.              Passed - Medication is active on med list        Passed - Patient is age 18 or older        Passed - Normal serum creatinine on file in past 12 months     Recent Labs   Lab Test 06/23/21  0809   CR 0.86       Ok to refill medication if creatinine is low          Passed - Normal serum potassium on file in past 12 months     Recent Labs   Lab Test 06/23/21  0809   POTASSIUM 4.9                  Brett Blake RN 09/02/21 2:37 PM  "

## 2021-09-09 ENCOUNTER — MEDICAL CORRESPONDENCE (OUTPATIENT)
Dept: HEALTH INFORMATION MANAGEMENT | Facility: CLINIC | Age: 66
End: 2021-09-09
Payer: COMMERCIAL

## 2021-10-16 ENCOUNTER — HEALTH MAINTENANCE LETTER (OUTPATIENT)
Age: 66
End: 2021-10-16

## 2021-10-28 ENCOUNTER — TRANSFERRED RECORDS (OUTPATIENT)
Dept: HEALTH INFORMATION MANAGEMENT | Facility: CLINIC | Age: 66
End: 2021-10-28
Payer: COMMERCIAL

## 2021-12-01 ENCOUNTER — TRANSFERRED RECORDS (OUTPATIENT)
Dept: HEALTH INFORMATION MANAGEMENT | Facility: CLINIC | Age: 66
End: 2021-12-01
Payer: COMMERCIAL

## 2021-12-14 ENCOUNTER — OFFICE VISIT (OUTPATIENT)
Dept: FAMILY MEDICINE | Facility: CLINIC | Age: 66
End: 2021-12-14
Payer: COMMERCIAL

## 2021-12-14 VITALS
HEIGHT: 70 IN | BODY MASS INDEX: 26.33 KG/M2 | HEART RATE: 62 BPM | TEMPERATURE: 97.8 F | SYSTOLIC BLOOD PRESSURE: 131 MMHG | DIASTOLIC BLOOD PRESSURE: 87 MMHG | WEIGHT: 183.9 LBS

## 2021-12-14 DIAGNOSIS — C61 PROSTATE CANCER (H): ICD-10-CM

## 2021-12-14 DIAGNOSIS — L98.9 SKIN LESION: Primary | ICD-10-CM

## 2021-12-14 DIAGNOSIS — F17.200 TOBACCO USE DISORDER: ICD-10-CM

## 2021-12-14 PROCEDURE — 99213 OFFICE O/P EST LOW 20 MIN: CPT | Performed by: FAMILY MEDICINE

## 2021-12-14 ASSESSMENT — MIFFLIN-ST. JEOR: SCORE: 1620.41

## 2021-12-14 NOTE — PROGRESS NOTES
"Answers for HPI/ROS submitted by the patient on 12/14/2021  How many servings of fruits and vegetables do you eat daily?: 2-3  On average, how many sweetened beverages do you drink each day (Examples: soda, juice, sweet tea, etc.  Do NOT count diet or artificially sweetened beverages)?: 0  How many minutes a day do you exercise enough to make your heart beat faster?: 9 or less  How many days a week do you exercise enough to make your heart beat faster?: 3 or less      Assessment & Plan     Skin lesion  Highly suspicious for cancer. Got him on the derm schedule for next week for removal. Sent a picture to Dr Davis. Discussed with the patient who understands and agrees.     Prostate cancer (H)  Currently being treated     Nicotine Dependence  Quit on 12/1/21 - encouraged and congratulated him!        Tobacco Cessation:   reports that he has been smoking cigarettes. He has been smoking about 0.75 packs per day. He has never used smokeless tobacco.  Tobacco Cessation Action Plan: he just quit 2 weeks ago!    BMI:   Estimated body mass index is 26.39 kg/m  as calculated from the following:    Height as of this encounter: 1.778 m (5' 10\").    Weight as of this encounter: 83.4 kg (183 lb 14.4 oz).         Return in about 1 week (around 12/21/2021) for with specialist.    Delphine Wolf MD  [facility] ESPERANZA Rousseau is a 66 year old who presents for the following health issues ;       Lesion on his left forearm    Past 2 months   Getting bigger   Painful to the touch   No drainage     Quit smoking on 12/1/21 with NO meds     Tried chantix but it didn't work for him - terrible nightmares     Review of Systems   Constitutional, HEENT, cardiovascular, pulmonary, gi and gu systems are negative, except as otherwise noted.      Objective    /87   Pulse 62   Temp 97.8  F (36.6  C) (Tympanic)   Ht 1.778 m (5' 10\")   Wt 83.4 kg (183 lb 14.4 oz)   BMI 26.39 kg/m    Body mass index is 26.39 " kg/m .  Physical Exam   GENERAL: healthy, alert and no distress  SKIN: papular dark red smooth lesion with central firm grey prominence.lesion is 17mm in width and approximately 5mm in height.   PSYCH: mentation appears normal, affect normal/bright

## 2021-12-21 ENCOUNTER — OFFICE VISIT (OUTPATIENT)
Dept: DERMATOLOGY | Facility: CLINIC | Age: 66
End: 2021-12-21
Payer: COMMERCIAL

## 2021-12-21 VITALS — DIASTOLIC BLOOD PRESSURE: 97 MMHG | OXYGEN SATURATION: 100 % | HEART RATE: 67 BPM | SYSTOLIC BLOOD PRESSURE: 154 MMHG

## 2021-12-21 DIAGNOSIS — D48.5 NEOPLASM OF UNCERTAIN BEHAVIOR OF SKIN: ICD-10-CM

## 2021-12-21 DIAGNOSIS — L82.1 SEBORRHEIC KERATOSIS: ICD-10-CM

## 2021-12-21 DIAGNOSIS — D18.01 ANGIOMA OF SKIN: ICD-10-CM

## 2021-12-21 DIAGNOSIS — C44.629 SQUAMOUS CELL CANCER OF SKIN OF LEFT FOREARM: ICD-10-CM

## 2021-12-21 DIAGNOSIS — D23.9 DERMAL NEVUS: ICD-10-CM

## 2021-12-21 DIAGNOSIS — L81.4 LENTIGO: Primary | ICD-10-CM

## 2021-12-21 PROCEDURE — 99204 OFFICE O/P NEW MOD 45 MIN: CPT | Mod: 25 | Performed by: DERMATOLOGY

## 2021-12-21 PROCEDURE — 11102 TANGNTL BX SKIN SINGLE LES: CPT | Mod: 59 | Performed by: DERMATOLOGY

## 2021-12-21 PROCEDURE — 88331 PATH CONSLTJ SURG 1 BLK 1SPC: CPT | Mod: 59 | Performed by: DERMATOLOGY

## 2021-12-21 PROCEDURE — 17313 MOHS 1 STAGE T/A/L: CPT | Performed by: DERMATOLOGY

## 2021-12-21 PROCEDURE — 88305 TISSUE EXAM BY PATHOLOGIST: CPT | Mod: 59 | Performed by: DERMATOLOGY

## 2021-12-21 PROCEDURE — 88342 IMHCHEM/IMCYTCHM 1ST ANTB: CPT | Mod: 59 | Performed by: DERMATOLOGY

## 2021-12-21 PROCEDURE — 12032 INTMD RPR S/A/T/EXT 2.6-7.5: CPT | Mod: 59 | Performed by: DERMATOLOGY

## 2021-12-21 PROCEDURE — 11103 TANGNTL BX SKIN EA SEP/ADDL: CPT | Mod: 59 | Performed by: DERMATOLOGY

## 2021-12-21 NOTE — PROGRESS NOTES
Gualberto ORTA Israel , a 66 year old year old male patient, I was asked to see by Dr. Wolf for lesion on left arm.  Patient states this has been present for a coule weeks.  .  Patient reports the following symptoms:  tender .  Patient reports the following previous treatments none.  Patient reports the following modifying factors none.  Associated symptoms: none.  Patient has no other skin complaints today.  Remainder of the HPI, Meds, PMH, Allergies, FH, and SH was reviewed in chart.    History reviewed. No pertinent past medical history.    Past Surgical History:   Procedure Laterality Date     HC KNEE SCOPE, DIAGNOSTIC      Description: Arthroscopy Knee Left;  Recorded: 10/25/2010;  Comments: ACL reconstruction in 1991, repair of meniscus 10/2010     OTHER SURGICAL HISTORY  10/2012    bowel obstruction        History reviewed. No pertinent family history.    Social History     Socioeconomic History     Marital status:      Spouse name: Not on file     Number of children: Not on file     Years of education: Not on file     Highest education level: Not on file   Occupational History     Not on file   Tobacco Use     Smoking status: Current Every Day Smoker     Packs/day: 0.75     Types: Cigarettes     Smokeless tobacco: Never Used   Substance and Sexual Activity     Alcohol use: Yes     Comment: Alcoholic Drinks/day: 4 drinks per wk     Drug use: No     Sexual activity: Yes     Partners: Female     Birth control/protection: Surgical     Comment: Vasectomy   Other Topics Concern     Not on file   Social History Narrative    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  Lives with wife.  Has adult children.  Mehul Del Toro MD  11/6/2018      The 10-year ASCVD risk score (Faustino RONAK Jr, et al., 2013) is: 38.1%    Values used to calculate the score:      Age: 63 years      S ex: Male      Is Non- : No      Diabetic: No      Tobacco smoker: Yes      Systolic Blood Pressure: 188 mmHg      Is BP  treated: No      HDL Cholesterol: 28 mg/dL      Total Cholesterol: 190 mg/dL  The 10-year ASCVD risk score (Hussein vinson DC, Jr, et al., 2013) is: 29.7%    Values used to calculate the score:      Age: 63 years      Sex: Male      Is Non- : No      Diabetic: No      Tobacco smoker: Yes      Systolic Blood Pressure: 158 mmHg      Is BP treated: No       HDL Cholesterol: 28 mg/dL      Total Cholesterol: 190 mg/dL       Social Determinants of Health     Financial Resource Strain: Not on file   Food Insecurity: Not on file   Transportation Needs: Not on file   Physical Activity: Not on file   Stress: Not on file   Social Connections: Not on file   Intimate Partner Violence: Not on file   Housing Stability: Not on file       Outpatient Encounter Medications as of 12/21/2021   Medication Sig Dispense Refill     aspirin 81 MG EC tablet [ASPIRIN 81 MG EC TABLET] Take 81 mg by mouth daily.       cholecalciferol, vitamin D3, (VITAMIN D3 ORAL) [CHOLECALCIFEROL, VITAMIN D3, (VITAMIN D3 ORAL)] Take 1 tablet by mouth daily.       DOCOSAHEXANOIC ACID/EPA (FISH OIL ORAL) [DOCOSAHEXANOIC ACID/EPA (FISH OIL ORAL)] Take 1 capsule by mouth daily.       hydroCHLOROthiazide (MICROZIDE) 12.5 mg capsule [HYDROCHLOROTHIAZIDE (MICROZIDE) 12.5 MG CAPSULE] TAKE 1 CAPSULE BY MOUTH EVERY DAY 90 capsule 3     hydroCHLOROthiazide (MICROZIDE) 12.5 mg capsule [HYDROCHLOROTHIAZIDE (MICROZIDE) 12.5 MG CAPSULE] Take 1 capsule (12.5 mg total) by mouth daily. 30 capsule 0     lisinopril (ZESTRIL) 20 MG tablet Take 1 tablet (20 mg) by mouth daily 90 tablet 3     VITAMIN B COMPLEX ORAL [VITAMIN B COMPLEX ORAL] Take 1 tablet by mouth daily.       No facility-administered encounter medications on file as of 12/21/2021.             Review Of Systems  Skin: As above  Eyes: negative  Ears/Nose/Throat: negative  Respiratory: No shortness of breath, dyspnea on exertion, cough, or hemoptysis  Cardiovascular: negative  Gastrointestinal:  negative  Genitourinary: negative  Musculoskeletal: negative  Neurologic: negative  Psychiatric: negative  Hematologic/Lymphatic/Immunologic: negative  Endocrine: negative      O:   NAD, WDWN, Alert & Oriented, Mood & Affect wnl, Vitals stable   Here today alone   BP (!) 154/97   Pulse 67   SpO2 100%    General appearance matthew ii   Vitals stable   Alert, oriented and in no acute distress      Following lymph nodes palpated: Occipital, Cervical, Supraclavicular , axilla no lad  R shoulder movable nodule  L sideburn irregularly pigmented patch   L forearm 2cm keratotic nodule   Stuck on papules and brown macules on trunk and ext    Red papules on trunk   Flesh colored papules on trunk      The remainder of expanded problem focused exam was normal; the following areas were examined:  scalp/hair, conjunctiva/lids, face, neck, lips, back, ab , chest, digits/nails, RUE, LUE.      Eyes: Conjunctivae/lids:Normal     ENT: Lips, buccal mucosa, tongue: normal    MSK:Normal    Cardiovascular: peripheral edema none    Pulm: Breathing Normal    Lymph Nodes: No Head and Neck Lymphadenopathy     Neuro/Psych: Orientation:Normal; Mood/Affect:Normal      MICRO:   L forearm :There is a proliferation of irregular nests of abnormal squamous cells arising from the epidermis and invading the dermis. These are well differentiated. The dermis shows a variable superficial perivascular inflammatory infiltrate.   A/P:  1. Seborrheic keratosis, lentigo, angioma, dermal nevus, epidermal cyst  2. L sideburn r/o melanoma in situ   TANGENTIAL BIOPSY SENT OUT:  After consent, anesthesia with LEC and prep, tangential excision performed and specimen sent out for permanent section histology.  No complications and routine wound care. Patient told to call our office in 1-2 weeks for result.       3. L forearm r/o squamous cell carcinoma   TANGENTIAL BIOPSY IN HOUSE:  After consent, anesthesia with LEC and prep, tangential excision performed and dx above  confirmed with frozen section histology.  No complications and routine wound care.  Patient is on asa  anticoagulants and risk of bleeding discussed with patient.       I have personally reviewed all specimens and/or slides and used them with my medical judgement to determine or confirm the final diagnosis.     Patient told result squamous cell carcinoma .    It was a pleasure speaking to Gualberto Wood today.  Previous clinic  notes and pertinent laboratory tests were reviewed prior to Gualberto Wood's visit.  Nature and genetics of benign skin lesions dicussed with patient.  Signs and Symptoms of skin cancer discussed with patient.  Patient encouraged to perform monthly skin exams.  UV precautions reviewed with patient.  Patient to follow up with Primary Care provider regarding elevated blood pressure.  Skin care regimen reviewed with patient: Eliminate harsh soaps, i.e. Dial, zest, irsih spring; Mild soaps such as Cetaphil or Dove sensitive skin, avoid hot or cold showers, aggressive use of emollients including vanicream, cetaphil or cerave discussed with patient.    Risks of non-melanoma skin cancer discussed with patient   Return to clinic 6 months  PROCEDURE NOTE  L forearm squamous cell carcinoma   MOHS:   Size    The rationale for Mohs surgery was discussed with the patient and consent was obtained.  The risks and benefits as well as alternatives to therapy were discussed, in detail.  Specifically, the risks of infection, scarring, bleeding, prolonged wound healing, incomplete removal, allergy to anesthesia, nerve injury and recurrence were addressed.  Indication for Mohs was Size. Prior to the procedure, the treatment site was clearly identified and, if available, confirmed with previous photos and confirmed by the patient   All components of the Universal Protocol/PAUSE rule were completed.  The Mohs surgeon operated in two distinct and integrated capacities as the surgeon and pathologist.      The area was  prepped with Betasept.  A rim of normal appearing skin was marked circumferentially around the lesion.  The area was infiltrated with local anesthesia.  The tumor was first debulked to remove all clinically apparent tumor.  An incision following the standard Mohs approach was done and the specimen was oriented,mapped and placed in 1 block(s).  Each specimen was then chromacoded and processed in the Mohs laboratory using standard Mohs technique and submitted for frozen section histology.  Frozen section analysis showed no residual tumor but CLEAR MARGINS.      The tumor was excised using standard Mohs technique in 1 stages(s).  CLEAR MARGINS OBTAINED and Final defect size was 2.5* cm.     We discussed the options for wound management in full with the patient including risks/benefits/ possible outcomes.        REPAIR int: Because of the tightness of the surrounding skin and Because of the size and full thickness nature of the defect, a intclosure was planned. After LEC anesthesia and prep, Burow's triangles were excised in the relaxed skin tension lines. The wound edges were widely undermined by dissection in the subcutaneous plane until adequate tissue mobility was obtained. Hemostasis was obtained. The wound edges were closed in a layered fashion using Vicryl and Fast Absorbing Plain Gut sutures. Postoperative length was 4.4 cm.   EBL minimal; complications none; wound care routine.  The patient was discharged in good condition and will return in one week for wound evaluation.

## 2021-12-21 NOTE — PATIENT INSTRUCTIONS
Wound Care Instructions - left sideburn    FOR SUPERFICIAL WOUNDS     [facility] [phone]    [facility] [phone]      AFTER 24 HOURS YOU SHOULD REMOVE THE BANDAGE AND BEGIN DAILY DRESSING CHANGES AS FOLLOWS:     1) Remove Dressing.     2) Clean and dry the area with tap water using a Q-tip or sterile gauze pad.     3) Apply Vaseline, Aquaphor, Polysporin ointment or Bacitracin ointment over entire wound.  Do NOT use Neosporin ointment.     4) Cover the wound with a band-aid, or a sterile non-stick gauze pad and micropore paper tape      REPEAT THESE INSTRUCTIONS AT LEAST ONCE A DAY UNTIL THE WOUND HAS COMPLETELY HEALED.    It is an old wives tale that a wound heals better when it is exposed to air and allowed to dry out. The wound will heal faster with a better cosmetic result if it is kept moist with ointment and covered with a bandage.    **Do not let the wound dry out.**      Supplies Needed:      *Cotton tipped applicators (Q-tips)    *Polysporin Ointment or Bacitracin Ointment (NOT NEOSPORIN)    *Band-aids or non-stick gauze pads and micropore paper tape.      PATIENT INFORMATION:    During the healing process you will notice a number of changes. All wounds develop a small halo of redness surrounding the wound.  This means healing is occurring. Severe itching with extensive redness usually indicates sensitivity to the ointment or bandage tape used to dress the wound.  You should call our office if this develops.      Swelling  and/or discoloration around your surgical site is common, particularly when performed around the eye.    All wounds normally drain.  The larger the wound the more drainage there will be.  After 7-10 days, you will notice the wound beginning to shrink and new skin will begin to grow.  The wound is healed when you can see skin has formed over the entire area.  A healed wound has a healthy, shiny look to the surface and is red to dark pink in color to  normalize.  Wounds may take approximately 4-6 weeks to heal.  Larger wounds may take 6-8 weeks.  After the wound is healed you may discontinue dressing changes.    You may experience a sensation of tightness as your wound heals. This is normal and will gradually subside.    Your healed wound may be sensitive to temperature changes. This sensitivity improves with time, but if you re having a lot of discomfort, try to avoid temperature extremes.    Patients frequently experience itching after their wound appears to have healed because of the continue healing under the skin.  Plain Vaseline will help relieve the itching.      POSSIBLE COMPLICATIONS    BLEEDIN. Leave the bandage in place.  2. Use tightly rolled up gauze or a cloth to apply direct pressure over the bandage for 30  minutes.  3. Reapply pressure for an additional 30 minutes if necessary  4. Use additional gauze and tape to maintain pressure once the bleeding has stopped.        Sutured Wound Care  [facility]: [phone]    [facility]: [phone]          ? No strenuous activity for 48 hours. Resume moderate activity in 48 hours. No heavy exercising until you are seen for follow up in one week.     ? Take Tylenol as needed for discomfort.                         ? Do not drink alcoholic beverages for 48 hours.     ? Keep the pressure bandage in place for 24 hours. If the bandage becomes blood tinged or loose, reinforce it with gauze and tape.        (Refer to the reverse side of this page for management of bleeding).    ? Remove pressure bandage in 24 hours     ? Leave the flat bandage in place until your follow up appointment.    ? Keep the bandage dry. Wash around it carefully.    ? If the tape becomes soiled or starts to come off, reinforce it with additional paper tape.    ? Do not smoke for 3 weeks; smoking is detrimental to wound healing.    ? It is normal to have swelling and bruising around the surgical  site. The bruising will fade in approximately 10-14 days. Elevate the area to reduce swelling.    ? Numbness, itchiness and sensitivity to temperature changes can occur after surgery and may take up to 18 months to normalize.      POSSIBLE COMPLICATIONS    BLEEDIN. Leave the bandage in place.  6. Use tightly rolled up gauze or a cloth to apply direct pressure over the bandage for 20   minutes.  7. Reapply pressure for an additional 20 minutes if necessary  8. Call the office or go to the nearest emergency room if pressure fails to stop the bleeding.  9. Use additional gauze and tape to maintain pressure once the bleeding has stopped.        PAIN:    1. Post operative pain should slowly get better, never worse.  2. A severe increase in pain may indicate a problem. Call the office if this occurs.    In case of emergency phone:Dr Davis [phone]          Bandage change instructions    In one week:   -Remove all bandages on this day :   -Clean area with water  -Pat dry  -Apply steri strips to the sutures  -Apply piece of paper tape over steri strips  -Keep bandages on for one more week.  Keep dry.      FOLLOW INSTRUCTIONS BELOW      WOUND CARE INSTRUCTIONS  for  ONE WEEK AFTER SURGERY          On  Remove the bandage on your arm for good. Then you may resume your regular skin care routine, including washing with mild soap and water, applying moisturizer, make-up and sunscreen.    1) If there are any open or bleeding areas at the incision/graft site you should begin to cover the area with a bandage daily as follows:    1) Clean and dry the area with plain tap water using a Q-tip or sterile gauze pad.  2) Apply Polysporin, Vaseline or Bacitracin ointment to the open area.  3) Cover the wound with a band-aid or a sterile non-stick gauze pad and micropore paper tape.         SIGNS OF INFECTION  - If you notice any of these signs of infection, call your doctor right away: expanding redness around the  wound.  - Yellow or greenish-colored pus or cloudy wound drainage.    - Red streaking spreading from the wound.  - Increased swelling, tenderness, or pain around the wound.   - Fever.    Please remember that yellow and clear drainage from a wound can be normal and related to normal wound healing.  Isolated drainage from a wound without a combination of the above features does not indicate infection.       *Once the bandages are removed, the scar will be red and firm (especially in the lip/chin area). This is normal and will fade in time. It might take 6-12 months for this to happen.     *Massaging the area will help the scar soften and fade quicker. Begin to massage the area one month after the bandages have been removed. To massage apply pressure directly and firmly over the scar with the fingertips and move in a circular motion. Massage the area for a few minutes several times a day. Continue to massage the site for several months.    *Approximately 6-8 weeks after surgery it is not uncommon to see the formation of  tender pimple-like  bump along the scar. This is normal. As the scar continues to mature and the stitches underneath the skin begin to dissolve, this might occur. Do not pick or squeeze, this will resolve on it s own. Should one break open producing a small amount of drainage, apply Polysporin or Bacitracin ointment a few times a day until the wound is completely healed.    *Numbness in the surgical area is expected. It might take 12-18 months for the feeling to return to normal. During this time sensations of itchiness, tingling and occasional sharp pains might be noted. These feelings are normal and will subside once the nerves have completely healed.         IN CASE OF EMERGENCY: Dr Davis [phone]     If you were seen in [place] call: [phone]    If you were seen in [place] call: [phone]

## 2021-12-21 NOTE — LETTER
12/21/2021         RE: Gualberto Wood  [address] [place] [address]        Dear Colleague,    Thank you for referring your patient, Gualberto Wood, to the [facility]. Please see a copy of my visit note below.    Surgical Office Location :   [facility]  [address] [place] [address]      Gualberto Wood , a 66 year old year old male patient, I was asked to see by Dr. Wolf for lesion on left arm.  Patient states this has been present for a coule weeks.  .  Patient reports the following symptoms:  tender .  Patient reports the following previous treatments none.  Patient reports the following modifying factors none.  Associated symptoms: none.  Patient has no other skin complaints today.  Remainder of the HPI, Meds, PMH, Allergies, FH, and SH was reviewed in chart.    History reviewed. No pertinent past medical history.    Past Surgical History:   Procedure Laterality Date     HC KNEE SCOPE, DIAGNOSTIC      Description: Arthroscopy Knee Left;  Recorded: 10/25/2010;  Comments: ACL reconstruction in 1991, repair of meniscus 10/2010     OTHER SURGICAL HISTORY  10/2012    bowel obstruction        History reviewed. No pertinent family history.    Social History     Socioeconomic History     Marital status:      Spouse name: Not on file     Number of children: Not on file     Years of education: Not on file     Highest education level: Not on file   Occupational History     Not on file   Tobacco Use     Smoking status: Current Every Day Smoker     Packs/day: 0.75     Types: Cigarettes     Smokeless tobacco: Never Used   Substance and Sexual Activity     Alcohol use: Yes     Comment: Alcoholic Drinks/day: 4 drinks per wk     Drug use: No     Sexual activity: Yes     Partners: Female     Birth control/protection: Surgical     Comment: Vasectomy   Other Topics Concern     Not on file   Social History Narrative    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  Lives with  wife.  Has adult children.  Mehul Del Toro MD  11/6/2018      The 10-year ASCVD risk score (Faustino SIMONS Jr, et al., 2013) is: 38.1%    Values used to calculate the score:      Age: 63 years      S ex: Male      Is Non- : No      Diabetic: No      Tobacco smoker: Yes      Systolic Blood Pressure: 188 mmHg      Is BP treated: No      HDL Cholesterol: 28 mg/dL      Total Cholesterol: 190 mg/dL  The 10-year ASCVD risk score (Hussein vinson DC, Jr et al., 2013) is: 29.7%    Values used to calculate the score:      Age: 63 years      Sex: Male      Is Non- : No      Diabetic: No      Tobacco smoker: Yes      Systolic Blood Pressure: 158 mmHg      Is BP treated: No       HDL Cholesterol: 28 mg/dL      Total Cholesterol: 190 mg/dL       Social Determinants of Health     Financial Resource Strain: Not on file   Food Insecurity: Not on file   Transportation Needs: Not on file   Physical Activity: Not on file   Stress: Not on file   Social Connections: Not on file   Intimate Partner Violence: Not on file   Housing Stability: Not on file       Outpatient Encounter Medications as of 12/21/2021   Medication Sig Dispense Refill     aspirin 81 MG EC tablet [ASPIRIN 81 MG EC TABLET] Take 81 mg by mouth daily.       cholecalciferol, vitamin D3, (VITAMIN D3 ORAL) [CHOLECALCIFEROL, VITAMIN D3, (VITAMIN D3 ORAL)] Take 1 tablet by mouth daily.       DOCOSAHEXANOIC ACID/EPA (FISH OIL ORAL) [DOCOSAHEXANOIC ACID/EPA (FISH OIL ORAL)] Take 1 capsule by mouth daily.       hydroCHLOROthiazide (MICROZIDE) 12.5 mg capsule [HYDROCHLOROTHIAZIDE (MICROZIDE) 12.5 MG CAPSULE] TAKE 1 CAPSULE BY MOUTH EVERY DAY 90 capsule 3     hydroCHLOROthiazide (MICROZIDE) 12.5 mg capsule [HYDROCHLOROTHIAZIDE (MICROZIDE) 12.5 MG CAPSULE] Take 1 capsule (12.5 mg total) by mouth daily. 30 capsule 0     lisinopril (ZESTRIL) 20 MG tablet Take 1 tablet (20 mg) by mouth daily 90 tablet 3     VITAMIN B COMPLEX ORAL [VITAMIN B COMPLEX  ORAL] Take 1 tablet by mouth daily.       No facility-administered encounter medications on file as of 12/21/2021.             Review Of Systems  Skin: As above  Eyes: negative  Ears/Nose/Throat: negative  Respiratory: No shortness of breath, dyspnea on exertion, cough, or hemoptysis  Cardiovascular: negative  Gastrointestinal: negative  Genitourinary: negative  Musculoskeletal: negative  Neurologic: negative  Psychiatric: negative  Hematologic/Lymphatic/Immunologic: negative  Endocrine: negative      O:   NAD, WDWN, Alert & Oriented, Mood & Affect wnl, Vitals stable   Here today alone   BP (!) 154/97   Pulse 67   SpO2 100%    General appearance matthew ii   Vitals stable   Alert, oriented and in no acute distress      Following lymph nodes palpated: Occipital, Cervical, Supraclavicular , axilla no lad  R shoulder movable nodule  L sideburn irregularly pigmented patch   L forearm 2cm keratotic nodule   Stuck on papules and brown macules on trunk and ext    Red papules on trunk   Flesh colored papules on trunk      The remainder of expanded problem focused exam was normal; the following areas were examined:  scalp/hair, conjunctiva/lids, face, neck, lips, back, ab , chest, digits/nails, RUE, LUE.      Eyes: Conjunctivae/lids:Normal     ENT: Lips, buccal mucosa, tongue: normal    MSK:Normal    Cardiovascular: peripheral edema none    Pulm: Breathing Normal    Lymph Nodes: No Head and Neck Lymphadenopathy     Neuro/Psych: Orientation:Normal; Mood/Affect:Normal      MICRO:   L forearm :There is a proliferation of irregular nests of abnormal squamous cells arising from the epidermis and invading the dermis. These are well differentiated. The dermis shows a variable superficial perivascular inflammatory infiltrate.   A/P:  1. Seborrheic keratosis, lentigo, angioma, dermal nevus, epidermal cyst  2. L sideburn r/o melanoma in situ   TANGENTIAL BIOPSY SENT OUT:  After consent, anesthesia with LEC and prep, tangential excision  performed and specimen sent out for permanent section histology.  No complications and routine wound care. Patient told to call our office in 1-2 weeks for result.       3. L forearm r/o squamous cell carcinoma   TANGENTIAL BIOPSY IN HOUSE:  After consent, anesthesia with LEC and prep, tangential excision performed and dx above confirmed with frozen section histology.  No complications and routine wound care.  Patient is on asa  anticoagulants and risk of bleeding discussed with patient.       I have personally reviewed all specimens and/or slides and used them with my medical judgement to determine or confirm the final diagnosis.     Patient told result squamous cell carcinoma .    It was a pleasure speaking to Gualberto Wood today.  Previous clinic  notes and pertinent laboratory tests were reviewed prior to Gualberto Wood's visit.  Nature and genetics of benign skin lesions dicussed with patient.  Signs and Symptoms of skin cancer discussed with patient.  Patient encouraged to perform monthly skin exams.  UV precautions reviewed with patient.  Patient to follow up with Primary Care provider regarding elevated blood pressure.  Skin care regimen reviewed with patient: Eliminate harsh soaps, i.e. Dial, zest, irsih spring; Mild soaps such as Cetaphil or Dove sensitive skin, avoid hot or cold showers, aggressive use of emollients including vanicream, cetaphil or cerave discussed with patient.    Risks of non-melanoma skin cancer discussed with patient   Return to clinic 6 months  PROCEDURE NOTE  L forearm squamous cell carcinoma   MOHS:   Size    The rationale for Mohs surgery was discussed with the patient and consent was obtained.  The risks and benefits as well as alternatives to therapy were discussed, in detail.  Specifically, the risks of infection, scarring, bleeding, prolonged wound healing, incomplete removal, allergy to anesthesia, nerve injury and recurrence were addressed.  Indication for Mohs was Size.  Prior to the procedure, the treatment site was clearly identified and, if available, confirmed with previous photos and confirmed by the patient   All components of the Universal Protocol/PAUSE rule were completed.  The Mohs surgeon operated in two distinct and integrated capacities as the surgeon and pathologist.      The area was prepped with Betasept.  A rim of normal appearing skin was marked circumferentially around the lesion.  The area was infiltrated with local anesthesia.  The tumor was first debulked to remove all clinically apparent tumor.  An incision following the standard Mohs approach was done and the specimen was oriented,mapped and placed in 1 block(s).  Each specimen was then chromacoded and processed in the Mohs laboratory using standard Mohs technique and submitted for frozen section histology.  Frozen section analysis showed no residual tumor but CLEAR MARGINS.      The tumor was excised using standard Mohs technique in 1 stages(s).  CLEAR MARGINS OBTAINED and Final defect size was 2.5* cm.     We discussed the options for wound management in full with the patient including risks/benefits/ possible outcomes.        REPAIR int: Because of the tightness of the surrounding skin and Because of the size and full thickness nature of the defect, a intclosure was planned. After LEC anesthesia and prep, Burow's triangles were excised in the relaxed skin tension lines. The wound edges were widely undermined by dissection in the subcutaneous plane until adequate tissue mobility was obtained. Hemostasis was obtained. The wound edges were closed in a layered fashion using Vicryl and Fast Absorbing Plain Gut sutures. Postoperative length was 4.4 cm.   EBL minimal; complications none; wound care routine.  The patient was discharged in good condition and will return in one week for wound evaluation.        Again, thank you for allowing me to participate in the care of your patient.         Sincerely,        Edenilson Davis MD

## 2021-12-22 ENCOUNTER — OFFICE VISIT (OUTPATIENT)
Dept: FAMILY MEDICINE | Facility: CLINIC | Age: 66
End: 2021-12-22
Payer: COMMERCIAL

## 2021-12-22 VITALS
DIASTOLIC BLOOD PRESSURE: 84 MMHG | BODY MASS INDEX: 26.54 KG/M2 | SYSTOLIC BLOOD PRESSURE: 125 MMHG | HEART RATE: 46 BPM | WEIGHT: 185 LBS | RESPIRATION RATE: 16 BRPM

## 2021-12-22 DIAGNOSIS — Z87.891 FORMER SMOKER: ICD-10-CM

## 2021-12-22 DIAGNOSIS — I71.40 ABDOMINAL AORTIC ANEURYSM (AAA) WITHOUT RUPTURE (H): ICD-10-CM

## 2021-12-22 DIAGNOSIS — Z98.890 S/P SKIN BIOPSY: ICD-10-CM

## 2021-12-22 DIAGNOSIS — C61 PROSTATE CANCER (H): ICD-10-CM

## 2021-12-22 DIAGNOSIS — D12.6 BENIGN NEOPLASM OF COLON, UNSPECIFIED PART OF COLON: ICD-10-CM

## 2021-12-22 PROCEDURE — 99213 OFFICE O/P EST LOW 20 MIN: CPT | Performed by: FAMILY MEDICINE

## 2021-12-22 NOTE — PATIENT INSTRUCTIONS
Schedule an ultrasound of the AAA to better quantify size and need for follow up.    Good job on smoking cessation    Follow up with Urology per there recommendations.     Low dose CT of the Chest due in Feb 2022    Colonoscopy due in Jan 2022    Re- dress both wounds.

## 2021-12-22 NOTE — PROGRESS NOTES
DIAGNOSIS:  Encounter Diagnoses   Name Primary?     S/P skin biopsy      Benign neoplasm of colon, unspecified part of colon, colonoscopy due in Jan 2022      Prostate cancer (H), follow up with Urology      Abdominal aortic aneurysm (AAA) without rupture (H), schedule ultrasound to assess AAA      Former smoker,   Low dose Chest CT due Feb 2022         PLAN:     Schedule an ultrasound of the AAA to better quantify size and need for follow up.    Good job on smoking cessation    Follow up with Urology per there recommendations.     Low dose CT of the Chest due in Feb 2022    Colonoscopy due in Jan 2022    Re- dress both wounds.            HPI:    Saw derm yesterday and a lesions was removed from the left arm and anterior to his left ear  Had an excisional biopsy yesterday of the left forearm for a presumptive SCC.   Here to have wounds re-dressed    Quit smoking 12-1-21 and is due for a yearly Low Dose Chest CT in Feb 2022      Current Outpatient Medications   Medication     aspirin 81 MG EC tablet     cholecalciferol, vitamin D3, (VITAMIN D3 ORAL)     DOCOSAHEXANOIC ACID/EPA (FISH OIL ORAL)     hydroCHLOROthiazide (MICROZIDE) 12.5 mg capsule     lisinopril (ZESTRIL) 20 MG tablet     No current facility-administered medications for this visit.       Pmh: reviewed  Psh: reviewed  Allergy:  reviewed      EXAM:    /84 (BP Location: Right arm, Patient Position: Sitting, Cuff Size: Adult Large)   Pulse (!) 46   Resp 16   Wt 83.9 kg (185 lb)   BMI 26.54 kg/m    GEN:   ALERT, NAD, ORIENTED TIMES THREE  SKIN:  SMALL SHAVE BX SITE <1CMN ANTERIOR TO THE LEFT EAR WITHOUT REDNESS OR DRAINAGE.             EXC BX SITE ON THE LEFT FOREARM WITH STERI STRIPS IN PLACE AND WITHOUT REDNESS OR DRAINAGE.

## 2021-12-27 LAB
PATH REPORT.COMMENTS IMP SPEC: NORMAL
PATH REPORT.COMMENTS IMP SPEC: NORMAL
PATH REPORT.FINAL DX SPEC: NORMAL
PATH REPORT.GROSS SPEC: NORMAL
PATH REPORT.MICROSCOPIC SPEC OTHER STN: NORMAL
PATH REPORT.RELEVANT HX SPEC: NORMAL

## 2021-12-28 ENCOUNTER — MYC MEDICAL ADVICE (OUTPATIENT)
Dept: DERMATOLOGY | Facility: CLINIC | Age: 66
End: 2021-12-28
Payer: COMMERCIAL

## 2022-01-06 ENCOUNTER — HOSPITAL ENCOUNTER (OUTPATIENT)
Dept: CT IMAGING | Facility: HOSPITAL | Age: 67
End: 2022-01-06
Attending: FAMILY MEDICINE
Payer: COMMERCIAL

## 2022-01-06 ENCOUNTER — HOSPITAL ENCOUNTER (OUTPATIENT)
Dept: ULTRASOUND IMAGING | Facility: HOSPITAL | Age: 67
End: 2022-01-06
Attending: FAMILY MEDICINE
Payer: COMMERCIAL

## 2022-01-06 DIAGNOSIS — I71.40 ABDOMINAL AORTIC ANEURYSM (AAA) WITHOUT RUPTURE (H): ICD-10-CM

## 2022-01-06 DIAGNOSIS — Z87.891 FORMER SMOKER: ICD-10-CM

## 2022-01-06 PROCEDURE — 71250 CT THORAX DX C-: CPT

## 2022-01-06 PROCEDURE — 76775 US EXAM ABDO BACK WALL LIM: CPT

## 2022-03-16 ENCOUNTER — TRANSFERRED RECORDS (OUTPATIENT)
Dept: HEALTH INFORMATION MANAGEMENT | Facility: CLINIC | Age: 67
End: 2022-03-16
Payer: COMMERCIAL

## 2022-06-01 ENCOUNTER — TRANSFERRED RECORDS (OUTPATIENT)
Dept: HEALTH INFORMATION MANAGEMENT | Facility: CLINIC | Age: 67
End: 2022-06-01
Payer: COMMERCIAL

## 2022-07-17 ENCOUNTER — HEALTH MAINTENANCE LETTER (OUTPATIENT)
Age: 67
End: 2022-07-17

## 2022-08-09 ENCOUNTER — OFFICE VISIT (OUTPATIENT)
Dept: DERMATOLOGY | Facility: CLINIC | Age: 67
End: 2022-08-09
Payer: COMMERCIAL

## 2022-08-09 DIAGNOSIS — Z85.828 HISTORY OF SKIN CANCER: ICD-10-CM

## 2022-08-09 DIAGNOSIS — L72.0 EPIDERMAL CYST: ICD-10-CM

## 2022-08-09 DIAGNOSIS — D23.9 DERMATOFIBROMA: ICD-10-CM

## 2022-08-09 DIAGNOSIS — D23.9 DERMAL NEVUS: ICD-10-CM

## 2022-08-09 DIAGNOSIS — D18.01 ANGIOMA OF SKIN: ICD-10-CM

## 2022-08-09 DIAGNOSIS — L82.0 INFLAMED SEBORRHEIC KERATOSIS: ICD-10-CM

## 2022-08-09 DIAGNOSIS — L82.1 SEBORRHEIC KERATOSIS: ICD-10-CM

## 2022-08-09 DIAGNOSIS — L81.4 LENTIGO: Primary | ICD-10-CM

## 2022-08-09 PROCEDURE — 17110 DESTRUCTION B9 LES UP TO 14: CPT | Performed by: DERMATOLOGY

## 2022-08-09 PROCEDURE — 99213 OFFICE O/P EST LOW 20 MIN: CPT | Mod: 25 | Performed by: DERMATOLOGY

## 2022-08-09 ASSESSMENT — PAIN SCALES - GENERAL: PAINLEVEL: NO PAIN (0)

## 2022-08-09 NOTE — LETTER
8/9/2022         RE: Gualberto Wood  [address] [facility] [address]        Dear Colleague,    Thank you for referring your patient, Gualberto Wood, to the [facility]. Please see a copy of my visit note below.    Gualberto Wood is an extremely pleasant 67 year old year old male patient here today for spot on left nekc, R shoulder and right leg.   .   Patient states this has been present for a while.  Patient reports the following symptoms:  Itching on neck.  Patient reports the following previous treatments none.  These treatments did not work.  Patient reports the following modifying factors none.  Associated symptoms: none.  Patient has no other skin complaints today.  Remainder of the HPI, Meds, PMH, Allergies, FH, and SH was reviewed in chart.      Past Medical History:   Diagnosis Date     Squamous cell carcinoma of skin, unspecified        Past Surgical History:   Procedure Laterality Date     HC KNEE SCOPE, DIAGNOSTIC      Description: Arthroscopy Knee Left;  Recorded: 10/25/2010;  Comments: ACL reconstruction in 1991, repair of meniscus 10/2010     OTHER SURGICAL HISTORY  10/2012    bowel obstruction        No family history on file.    Social History     Socioeconomic History     Marital status:      Spouse name: Not on file     Number of children: Not on file     Years of education: Not on file     Highest education level: Not on file   Occupational History     Not on file   Tobacco Use     Smoking status: Current Every Day Smoker     Packs/day: 0.75     Types: Cigarettes     Smokeless tobacco: Never Used   Substance and Sexual Activity     Alcohol use: Yes     Comment: Alcoholic Drinks/day: 4 drinks per wk     Drug use: No     Sexual activity: Yes     Partners: Female     Birth control/protection: Surgical     Comment: Vasectomy   Other Topics Concern     Not on file   Social History Narrative    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  Lives with wife.  Has adult  children.  Mehlu Del Toro MD  11/6/2018      The 10-year ASCVD risk score (Faustino SIMONS Jr et al., 2013) is: 38.1%    Values used to calculate the score:      Age: 63 years      S ex: Male      Is Non- : No      Diabetic: No      Tobacco smoker: Yes      Systolic Blood Pressure: 188 mmHg      Is BP treated: No      HDL Cholesterol: 28 mg/dL      Total Cholesterol: 190 mg/dL  The 10-year ASCVD risk score (Hussein vinson DC, Jr et al., 2013) is: 29.7%    Values used to calculate the score:      Age: 63 years      Sex: Male      Is Non- : No      Diabetic: No      Tobacco smoker: Yes      Systolic Blood Pressure: 158 mmHg      Is BP treated: No       HDL Cholesterol: 28 mg/dL      Total Cholesterol: 190 mg/dL       Social Determinants of Health     Financial Resource Strain: Not on file   Food Insecurity: Not on file   Transportation Needs: Not on file   Physical Activity: Not on file   Stress: Not on file   Social Connections: Not on file   Intimate Partner Violence: Not on file   Housing Stability: Not on file       Outpatient Encounter Medications as of 8/9/2022   Medication Sig Dispense Refill     aspirin 81 MG EC tablet [ASPIRIN 81 MG EC TABLET] Take 81 mg by mouth daily.       cholecalciferol, vitamin D3, (VITAMIN D3 ORAL) [CHOLECALCIFEROL, VITAMIN D3, (VITAMIN D3 ORAL)] Take 1 tablet by mouth daily.       DOCOSAHEXANOIC ACID/EPA (FISH OIL ORAL) [DOCOSAHEXANOIC ACID/EPA (FISH OIL ORAL)] Take 1 capsule by mouth daily.       hydrochlorothiazide (MICROZIDE) 12.5 MG capsule [HYDROCHLOROTHIAZIDE (MICROZIDE) 12.5 MG CAPSULE] TAKE 1 CAPSULE BY MOUTH EVERY DAY 90 capsule 0     lisinopril (ZESTRIL) 20 MG tablet Take 1 tablet (20 mg) by mouth daily 90 tablet 3     No facility-administered encounter medications on file as of 8/9/2022.             O:   NAD, WDWN, Alert & Oriented, Mood & Affect wnl, Vitals stable   Here today alone   General appearance normal   Vitals stable   Alert,  oriented and in no acute distress      Following lymph nodes palpated: Occipital, Cervical, Supraclavicular no lad  L neck inflamed seborrheic keratosis   R leg firm papule  R shoulder nodule with comedone       Stuck on papules and brown macules on trunk and ext   Red papules on trunk  Flesh colored papules on trunk     The remainder of the full exam was normal; the following areas were examined:  conjunctiva/lids, oral mucosa, neck, peripheral vascular system, abdomen, lymph nodes, digits/nails, eccrine and apocrine glands, scalp/hair, face, neck, chest, abdomen, buttocks, back, RUE, LUE, RLE, LLE       Eyes: Conjunctivae/lids:Normal     ENT: Lips, buccal mucosa, tongue: normal    MSK:Normal    Cardiovascular: peripheral edema none    Pulm: Breathing Normal    Lymph Nodes: No Head and Neck Lymphadenopathy     Neuro/Psych: Orientation:Alert and Orientedx3 ; Mood/Affect:normal       A/P:  1. Seborrheic keratosis, lentigo, angioma, dermal nevus, hx of non-melanoma skin cancer   2. L neck inflamed seborrheic keratosis   LN2:  Treated with LN2 for 5s for 1-2 cycles. Warned risks of blistering, pain, pigment change, scarring, and incomplete resolution.  Advised patient to return if lesions do not completely resolve.  Wound care sheet given.  3. Dermatofibroma  4. Epidermal cyst  Excision discussed with patient   It was a pleasure speaking to Gualberto Wood today.  Previous clinic notes and pertinent laboratory tests were reviewed prior to Gualberto Wood's visit.  Nature of benign skin lesions dicussed with patient.  UV precautions reviewed with patient.  Return to clinic next appt        Again, thank you for allowing me to participate in the care of your patient.        Sincerely,        Edenilson Davis MD

## 2022-08-09 NOTE — PROGRESS NOTES
Gualberto Wood is an extremely pleasant 67 year old year old male patient here today for spot on left nekc, R shoulder and right leg.   .   Patient states this has been present for a while.  Patient reports the following symptoms:  Itching on neck.  Patient reports the following previous treatments none.  These treatments did not work.  Patient reports the following modifying factors none.  Associated symptoms: none.  Patient has no other skin complaints today.  Remainder of the HPI, Meds, PMH, Allergies, FH, and SH was reviewed in chart.      Past Medical History:   Diagnosis Date     Squamous cell carcinoma of skin, unspecified        Past Surgical History:   Procedure Laterality Date     HC KNEE SCOPE, DIAGNOSTIC      Description: Arthroscopy Knee Left;  Recorded: 10/25/2010;  Comments: ACL reconstruction in 1991, repair of meniscus 10/2010     OTHER SURGICAL HISTORY  10/2012    bowel obstruction        No family history on file.    Social History     Socioeconomic History     Marital status:      Spouse name: Not on file     Number of children: Not on file     Years of education: Not on file     Highest education level: Not on file   Occupational History     Not on file   Tobacco Use     Smoking status: Current Every Day Smoker     Packs/day: 0.75     Types: Cigarettes     Smokeless tobacco: Never Used   Substance and Sexual Activity     Alcohol use: Yes     Comment: Alcoholic Drinks/day: 4 drinks per wk     Drug use: No     Sexual activity: Yes     Partners: Female     Birth control/protection: Surgical     Comment: Vasectomy   Other Topics Concern     Not on file   Social History Narrative    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  Lives with wife.  Has adult children.  Mehul Del Toro MD  11/6/2018      The 10-year ASCVD risk score (Clyde RONAK Jr, et al., 2013) is: 38.1%    Values used to calculate the score:      Age: 63 years      S ex: Male      Is Non- : No       Diabetic: No      Tobacco smoker: Yes      Systolic Blood Pressure: 188 mmHg      Is BP treated: No      HDL Cholesterol: 28 mg/dL      Total Cholesterol: 190 mg/dL  The 10-year ASCVD risk score (Hussein vinson DC Jr, et al., 2013) is: 29.7%    Values used to calculate the score:      Age: 63 years      Sex: Male      Is Non- : No      Diabetic: No      Tobacco smoker: Yes      Systolic Blood Pressure: 158 mmHg      Is BP treated: No       HDL Cholesterol: 28 mg/dL      Total Cholesterol: 190 mg/dL       Social Determinants of Health     Financial Resource Strain: Not on file   Food Insecurity: Not on file   Transportation Needs: Not on file   Physical Activity: Not on file   Stress: Not on file   Social Connections: Not on file   Intimate Partner Violence: Not on file   Housing Stability: Not on file       Outpatient Encounter Medications as of 8/9/2022   Medication Sig Dispense Refill     aspirin 81 MG EC tablet [ASPIRIN 81 MG EC TABLET] Take 81 mg by mouth daily.       cholecalciferol, vitamin D3, (VITAMIN D3 ORAL) [CHOLECALCIFEROL, VITAMIN D3, (VITAMIN D3 ORAL)] Take 1 tablet by mouth daily.       DOCOSAHEXANOIC ACID/EPA (FISH OIL ORAL) [DOCOSAHEXANOIC ACID/EPA (FISH OIL ORAL)] Take 1 capsule by mouth daily.       hydrochlorothiazide (MICROZIDE) 12.5 MG capsule [HYDROCHLOROTHIAZIDE (MICROZIDE) 12.5 MG CAPSULE] TAKE 1 CAPSULE BY MOUTH EVERY DAY 90 capsule 0     lisinopril (ZESTRIL) 20 MG tablet Take 1 tablet (20 mg) by mouth daily 90 tablet 3     No facility-administered encounter medications on file as of 8/9/2022.             O:   NAD, WDWN, Alert & Oriented, Mood & Affect wnl, Vitals stable   Here today alone   General appearance normal   Vitals stable   Alert, oriented and in no acute distress      Following lymph nodes palpated: Occipital, Cervical, Supraclavicular no lad  L neck inflamed seborrheic keratosis   R leg firm papule  R shoulder nodule with comedone       Stuck on papules and brown  macules on trunk and ext   Red papules on trunk  Flesh colored papules on trunk     The remainder of the full exam was normal; the following areas were examined:  conjunctiva/lids, oral mucosa, neck, peripheral vascular system, abdomen, lymph nodes, digits/nails, eccrine and apocrine glands, scalp/hair, face, neck, chest, abdomen, buttocks, back, RUE, LUE, RLE, LLE       Eyes: Conjunctivae/lids:Normal     ENT: Lips, buccal mucosa, tongue: normal    MSK:Normal    Cardiovascular: peripheral edema none    Pulm: Breathing Normal    Lymph Nodes: No Head and Neck Lymphadenopathy     Neuro/Psych: Orientation:Alert and Orientedx3 ; Mood/Affect:normal       A/P:  1. Seborrheic keratosis, lentigo, angioma, dermal nevus, hx of non-melanoma skin cancer   2. L neck inflamed seborrheic keratosis   LN2:  Treated with LN2 for 5s for 1-2 cycles. Warned risks of blistering, pain, pigment change, scarring, and incomplete resolution.  Advised patient to return if lesions do not completely resolve.  Wound care sheet given.  3. Dermatofibroma  4. Epidermal cyst  Excision discussed with patient   It was a pleasure speaking to Gualberto Wood today.  Previous clinic notes and pertinent laboratory tests were reviewed prior to Gualberto Wood's visit.  Nature of benign skin lesions dicussed with patient.  UV precautions reviewed with patient.  Return to clinic next appt

## 2022-08-09 NOTE — NURSING NOTE
Chief Complaint   Patient presents with     Derm Problem     Skin check        Isabel Melchor on 8/9/2022 at 1:37 PM

## 2022-09-25 ENCOUNTER — HEALTH MAINTENANCE LETTER (OUTPATIENT)
Age: 67
End: 2022-09-25

## 2022-10-06 DIAGNOSIS — I10 BENIGN ESSENTIAL HYPERTENSION: ICD-10-CM

## 2022-10-06 RX ORDER — HYDROCHLOROTHIAZIDE 12.5 MG/1
12.5 CAPSULE ORAL DAILY
Qty: 90 CAPSULE | Refills: 0 | Status: SHIPPED | OUTPATIENT
Start: 2022-10-06 | End: 2023-01-02

## 2022-10-06 NOTE — TELEPHONE ENCOUNTER
"Routing refill request to provider for review/approval because:  Labs not current:  Multiple  Due to medication information not transferring due to [facility] please review the medication information prior to signing to ensure accuracy.    Last Written Prescription Date:  7/6/22  Last Fill Quantity: 90,  # refills: 0   Last office visit provider:  12/22/21     Requested Prescriptions   Pending Prescriptions Disp Refills     hydrochlorothiazide (MICROZIDE) 12.5 MG capsule [Pharmacy Med Name: HYDROCHLOROTHIAZIDE 12.5 MG CP] 90 capsule 0     Sig: TAKE 1 CAPSULE BY MOUTH EVERY DAY       Diuretics (Including Combos) Protocol Failed - 10/6/2022 10:19 AM        Failed - Normal serum creatinine on file in past 12 months     Recent Labs   Lab Test 06/23/21  0809   CR 0.86              Failed - Normal serum potassium on file in past 12 months     Recent Labs   Lab Test 06/23/21  0809   POTASSIUM 4.9                    Failed - Normal serum sodium on file in past 12 months     Recent Labs   Lab Test 06/23/21  0809                 Passed - Blood pressure under 140/90 in past 12 months     BP Readings from Last 3 Encounters:   12/22/21 125/84   12/21/21 (!) 154/97   12/14/21 131/87                 Passed - Recent (12 mo) or future (30 days) visit within the authorizing provider's specialty     Patient has had an office visit with the authorizing provider or a provider within the authorizing providers department within the previous 12 mos or has a future within next 30 days. See \"Patient Info\" tab in inbasket, or \"Choose Columns\" in Meds & Orders section of the refill encounter.              Passed - Medication is active on med list        Passed - Patient is age 18 or older             Brett Blake RN 10/06/22 10:19 AM  "

## 2022-11-30 ENCOUNTER — OFFICE VISIT (OUTPATIENT)
Dept: DERMATOLOGY | Facility: CLINIC | Age: 67
End: 2022-11-30
Payer: COMMERCIAL

## 2022-11-30 VITALS — SYSTOLIC BLOOD PRESSURE: 142 MMHG | OXYGEN SATURATION: 99 % | HEART RATE: 53 BPM | DIASTOLIC BLOOD PRESSURE: 98 MMHG

## 2022-11-30 DIAGNOSIS — L72.0 EPIDERMAL CYST: Primary | ICD-10-CM

## 2022-11-30 PROCEDURE — 12031 INTMD RPR S/A/T/EXT 2.5 CM/<: CPT | Performed by: DERMATOLOGY

## 2022-11-30 PROCEDURE — 11403 EXC TR-EXT B9+MARG 2.1-3CM: CPT | Performed by: DERMATOLOGY

## 2022-11-30 PROCEDURE — 88331 PATH CONSLTJ SURG 1 BLK 1SPC: CPT | Performed by: DERMATOLOGY

## 2022-11-30 ASSESSMENT — PAIN SCALES - GENERAL: PAINLEVEL: NO PAIN (0)

## 2022-11-30 NOTE — PATIENT INSTRUCTIONS
Sutured Wound Care     [facility]: [phone]    [facility]: [phone]          No strenuous activity for 48 hours. Resume moderate activity in 48 hours. No heavy exercising until you are seen for follow up in one week.     Take Tylenol as needed for discomfort.                         Do not drink alcoholic beverages for 48 hours.     Keep the pressure bandage in place for 24 hours. If the bandage becomes blood tinged or loose, reinforce it with gauze and tape.        (Refer to the reverse side of this page for management of bleeding).    Remove pressure bandage in 24 hours (White)    Leave the flat bandage in place until your follow up appointment. (Blue Tape)    Keep the bandage dry. Wash around it carefully.    If the tape becomes soiled or starts to come off, reinforce it with additional paper tape.    Do not smoke for 3 weeks; smoking is detrimental to wound healing.    It is normal to have swelling and bruising around the surgical site. The bruising will fade in approximately 10-14 days. Elevate the area to reduce swelling.    Numbness, itchiness and sensitivity to temperature changes can occur after surgery and may take up to 18 months to normalize.      POSSIBLE COMPLICATIONS    BLEEDING:    Leave the bandage in place.  Use tightly rolled up gauze or a cloth to apply direct pressure over the bandage for 20   minutes.  Reapply pressure for an additional 20 minutes if necessary  Call the office or go to the nearest emergency room if pressure fails to stop the bleeding.  Use additional gauze and tape to maintain pressure once the bleeding has stopped.        PAIN:    Post operative pain should slowly get better, never worse.  A severe increase in pain may indicate a problem. Call the office if this occurs.    In case of emergency phone:Dr Davis [phone]

## 2022-11-30 NOTE — LETTER
11/30/2022         RE: Gualberto Wood  [address] [facility] [address]        Dear Colleague,    Thank you for referring your patient, Gualberto Wood, to the [facility]. Please see a copy of my visit note below.    Gualberto Wood is an extremely pleasant 67 year old year old male patient here today for evaluation and managment of cys ton right shoulder.  Patient has no other skin complaints today.  Remainder of the HPI, Meds, PMH, Allergies, FH, and SH was reviewed in chart.      Past Medical History:   Diagnosis Date     Squamous cell carcinoma of skin, unspecified        Past Surgical History:   Procedure Laterality Date      KNEE SCOPE, DIAGNOSTIC      Description: Arthroscopy Knee Left;  Recorded: 10/25/2010;  Comments: ACL reconstruction in 1991, repair of meniscus 10/2010     OTHER SURGICAL HISTORY  10/2012    bowel obstruction        No family history on file.    Social History     Socioeconomic History     Marital status:      Spouse name: Not on file     Number of children: Not on file     Years of education: Not on file     Highest education level: Not on file   Occupational History     Not on file   Tobacco Use     Smoking status: Every Day     Packs/day: 0.75     Types: Cigarettes     Smokeless tobacco: Never   Substance and Sexual Activity     Alcohol use: Yes     Comment: Alcoholic Drinks/day: 4 drinks per wk     Drug use: No     Sexual activity: Yes     Partners: Female     Birth control/protection: Surgical     Comment: Vasectomy   Other Topics Concern     Not on file   Social History Narrative    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  Lives with wife.  Has adult children.  Mehul Del Toro MD  11/6/2018      The 10-year ASCVD risk score (Faustino RONAK Jr, et al., 2013) is: 38.1%    Values used to calculate the score:      Age: 63 years      S ex: Male      Is Non- : No      Diabetic: No      Tobacco smoker: Yes      Systolic Blood Pressure:  188 mmHg      Is BP treated: No      HDL Cholesterol: 28 mg/dL      Total Cholesterol: 190 mg/dL  The 10-year ASCVD risk score (Hussein vinson DC, Jr, et al., 2013) is: 29.7%    Values used to calculate the score:      Age: 63 years      Sex: Male      Is Non- : No      Diabetic: No      Tobacco smoker: Yes      Systolic Blood Pressure: 158 mmHg      Is BP treated: No       HDL Cholesterol: 28 mg/dL      Total Cholesterol: 190 mg/dL       Social Determinants of Health     Financial Resource Strain: Not on file   Food Insecurity: Not on file   Transportation Needs: Not on file   Physical Activity: Not on file   Stress: Not on file   Social Connections: Not on file   Intimate Partner Violence: Not on file   Housing Stability: Not on file       Outpatient Encounter Medications as of 11/30/2022   Medication Sig Dispense Refill     aspirin 81 MG EC tablet [ASPIRIN 81 MG EC TABLET] Take 81 mg by mouth daily.       cholecalciferol, vitamin D3, (VITAMIN D3 ORAL) [CHOLECALCIFEROL, VITAMIN D3, (VITAMIN D3 ORAL)] Take 1 tablet by mouth daily.       DOCOSAHEXANOIC ACID/EPA (FISH OIL ORAL) [DOCOSAHEXANOIC ACID/EPA (FISH OIL ORAL)] Take 1 capsule by mouth daily.       hydrochlorothiazide (MICROZIDE) 12.5 MG capsule Take 1 capsule (12.5 mg) by mouth daily 90 capsule 0     lisinopril (ZESTRIL) 20 MG tablet TAKE 1 TABLET BY MOUTH EVERY DAY 90 tablet 0     No facility-administered encounter medications on file as of 11/30/2022.             O:   NAD, WDWN, Alert & Oriented, Mood & Affect wnl, Vitals stable   Here today alone   BP (!) 142/98   Pulse 53   SpO2 99%    General appearance normal   Vitals stable   Alert, oriented and in no acute distress     R ant shoulder 2.5cm nodule      Eyes: Conjunctivae/lids:Normal     ENT: Lips, buccal mucosa, tongue: normal    MSK:Normal    Cardiovascular: peripheral edema none    Pulm: Breathing Normal    Neuro/Psych: Orientation:Alert and Orientedx3 ; Mood/Affect:normal        MICRO:   R ant shoulder: cyst lined by stratified squamous epithelium with epidermal keratinization   A/P:  1. R ant shoulder epidermal cyst  Scar and recurrence discussed with patient   EXCISION OF CYST AND INT: After thorough discussion of PGACAC, consent obtained, anesthesia and prep, the margins of the cyst were identified and an incision was made encompassing the cyst. The incisions were made through the skin and down to and including the subcutaneous tissue. The cyst was removed en bloc and submitted for frozen pathologic review. The wound edges were undermined until adequate tissue mobility was obtained. hemostasis was achieved. The wound edges were then closed in a layered fashion with 3 vicryl and 5 fast, , being careful not to leave any dead space. Postoperative length was 1.9 cm.   EBL minimal; complications none; wound care routine. The patient was discharged in good condition and will return in one week for wound evaluation.  It was a pleasure speaking to Gualberto Wood today.        Again, thank you for allowing me to participate in the care of your patient.        Sincerely,        Edenilson Davis MD

## 2022-11-30 NOTE — PROGRESS NOTES
Gualberto Wood is an extremely pleasant 67 year old year old male patient here today for evaluation and managment of cys ton right shoulder.  Patient has no other skin complaints today.  Remainder of the HPI, Meds, PMH, Allergies, FH, and SH was reviewed in chart.      Past Medical History:   Diagnosis Date     Squamous cell carcinoma of skin, unspecified        Past Surgical History:   Procedure Laterality Date     HC KNEE SCOPE, DIAGNOSTIC      Description: Arthroscopy Knee Left;  Recorded: 10/25/2010;  Comments: ACL reconstruction in 1991, repair of meniscus 10/2010     OTHER SURGICAL HISTORY  10/2012    bowel obstruction        No family history on file.    Social History     Socioeconomic History     Marital status:      Spouse name: Not on file     Number of children: Not on file     Years of education: Not on file     Highest education level: Not on file   Occupational History     Not on file   Tobacco Use     Smoking status: Every Day     Packs/day: 0.75     Types: Cigarettes     Smokeless tobacco: Never   Substance and Sexual Activity     Alcohol use: Yes     Comment: Alcoholic Drinks/day: 4 drinks per wk     Drug use: No     Sexual activity: Yes     Partners: Female     Birth control/protection: Surgical     Comment: Vasectomy   Other Topics Concern     Not on file   Social History Narrative    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  Lives with wife.  Has adult children.  Mehul Del Toro MD  11/6/2018      The 10-year ASCVD risk score (Faustino SIMONS Jr, et al., 2013) is: 38.1%    Values used to calculate the score:      Age: 63 years      S ex: Male      Is Non- : No      Diabetic: No      Tobacco smoker: Yes      Systolic Blood Pressure: 188 mmHg      Is BP treated: No      HDL Cholesterol: 28 mg/dL      Total Cholesterol: 190 mg/dL  The 10-year ASCVD risk score (Hussein vinson DC, Jr et al., 2013) is: 29.7%    Values used to calculate the score:      Age: 63 years      Sex: Male       Is Non- : No      Diabetic: No      Tobacco smoker: Yes      Systolic Blood Pressure: 158 mmHg      Is BP treated: No       HDL Cholesterol: 28 mg/dL      Total Cholesterol: 190 mg/dL       Social Determinants of Health     Financial Resource Strain: Not on file   Food Insecurity: Not on file   Transportation Needs: Not on file   Physical Activity: Not on file   Stress: Not on file   Social Connections: Not on file   Intimate Partner Violence: Not on file   Housing Stability: Not on file       Outpatient Encounter Medications as of 11/30/2022   Medication Sig Dispense Refill     aspirin 81 MG EC tablet [ASPIRIN 81 MG EC TABLET] Take 81 mg by mouth daily.       cholecalciferol, vitamin D3, (VITAMIN D3 ORAL) [CHOLECALCIFEROL, VITAMIN D3, (VITAMIN D3 ORAL)] Take 1 tablet by mouth daily.       DOCOSAHEXANOIC ACID/EPA (FISH OIL ORAL) [DOCOSAHEXANOIC ACID/EPA (FISH OIL ORAL)] Take 1 capsule by mouth daily.       hydrochlorothiazide (MICROZIDE) 12.5 MG capsule Take 1 capsule (12.5 mg) by mouth daily 90 capsule 0     lisinopril (ZESTRIL) 20 MG tablet TAKE 1 TABLET BY MOUTH EVERY DAY 90 tablet 0     No facility-administered encounter medications on file as of 11/30/2022.             O:   NAD, WDWN, Alert & Oriented, Mood & Affect wnl, Vitals stable   Here today alone   BP (!) 142/98   Pulse 53   SpO2 99%    General appearance normal   Vitals stable   Alert, oriented and in no acute distress     R ant shoulder 2.5cm nodule      Eyes: Conjunctivae/lids:Normal     ENT: Lips, buccal mucosa, tongue: normal    MSK:Normal    Cardiovascular: peripheral edema none    Pulm: Breathing Normal    Neuro/Psych: Orientation:Alert and Orientedx3 ; Mood/Affect:normal       MICRO:   R ant shoulder: cyst lined by stratified squamous epithelium with epidermal keratinization   A/P:  1. R ant shoulder epidermal cyst  Scar and recurrence discussed with patient   EXCISION OF CYST AND INT: After thorough discussion of  PGACAC, consent obtained, anesthesia and prep, the margins of the cyst were identified and an incision was made encompassing the cyst. The incisions were made through the skin and down to and including the subcutaneous tissue. The cyst was removed en bloc and submitted for frozen pathologic review. The wound edges were undermined until adequate tissue mobility was obtained. hemostasis was achieved. The wound edges were then closed in a layered fashion with 3 vicryl and 5 fast, , being careful not to leave any dead space. Postoperative length was 1.9 cm.   EBL minimal; complications none; wound care routine. The patient was discharged in good condition and will return in one week for wound evaluation.  It was a pleasure speaking to Gualberto Wood today.

## 2022-11-30 NOTE — NURSING NOTE
"Initial BP (!) 142/98   Pulse 53   SpO2 99%  Estimated body mass index is 26.54 kg/m  as calculated from the following:    Height as of 12/14/21: 1.778 m (5' 10\").    Weight as of 12/22/21: 83.9 kg (185 lb). .        "

## 2022-12-06 ENCOUNTER — DOCUMENTATION ONLY (OUTPATIENT)
Dept: LAB | Facility: CLINIC | Age: 67
End: 2022-12-06

## 2022-12-06 DIAGNOSIS — E78.1 PURE HYPERGLYCERIDEMIA: ICD-10-CM

## 2022-12-06 DIAGNOSIS — Z12.5 SCREENING PSA (PROSTATE SPECIFIC ANTIGEN): Primary | ICD-10-CM

## 2022-12-06 DIAGNOSIS — Z00.00 ENCOUNTER FOR MEDICARE ANNUAL WELLNESS EXAM: ICD-10-CM

## 2022-12-06 DIAGNOSIS — I10 BENIGN ESSENTIAL HYPERTENSION: ICD-10-CM

## 2022-12-06 NOTE — PROGRESS NOTES
Gualberto Wood has an upcoming lab appointment:    Future Appointments   Date Time Provider Department Center   12/7/2022  1:15 PM Nurse, Mai [facility]   12/15/2022  8:15 AM [facility]   12/22/2022  3:10 PM Brett Groves MD [facility]     Patient is scheduled for the following lab(s):     Patient requesting fasting labs prior to visit on 12/22/2022.     There is no order available. Please review and place either future orders or HMPO (Review of Health Maintenance Protocol Orders), as appropriate.    Health Maintenance Due   Topic     ANNUAL REVIEW OF HM ORDERS      HEPATITIS C SCREENING      Lalita Chaves

## 2022-12-07 ENCOUNTER — ALLIED HEALTH/NURSE VISIT (OUTPATIENT)
Dept: DERMATOLOGY | Facility: CLINIC | Age: 67
End: 2022-12-07
Payer: COMMERCIAL

## 2022-12-07 DIAGNOSIS — Z48.01 ENCOUNTER FOR CHANGE OR REMOVAL OF SURGICAL WOUND DRESSING: Primary | ICD-10-CM

## 2022-12-07 PROCEDURE — 99207 PR NO CHARGE NURSE ONLY: CPT

## 2022-12-07 NOTE — PATIENT INSTRUCTIONS
WOUND CARE INSTRUCTIONS  for  ONE WEEK AFTER SURGERY          Right Shoulder    Leave flat bandage on your skin for one week after today s bandage change.  In one week when you remove the bandage, you may resume your regular skin care routine, including washing with mild soap and water, applying moisturizer, make-up and sunscreen.    If there are any open or bleeding areas at the incision/graft site you should begin to cover the area with a bandage daily as follows:    Clean and dry the area with plain tap water using a Q-tip or sterile gauze pad.  Apply Polysporin or Bacitracin ointment to the open area.  Cover the wound with a band-aid or a sterile non-stick gauze pad and micropore paper tape.     SIGNS OF INFECTION  - If you notice any of these signs of infection, call your doctor right away: expanding redness around the wound.  - Yellow or greenish-colored pus or cloudy wound drainage.    - Red streaking spreading from the wound.  - Increased swelling, tenderness, or pain around the wound.   - Fever.    Please remember that yellow and clear drainage from a wound can be normal and related to normal wound healing.  Isolated drainage from a wound without a combination of the above features does not indicate infection.     *Once the bandages are removed, the scar will be red and firm (especially in the lip/chin area). This is normal and will fade in time. It might take 6-12 months for this to happen.     *Massaging the area will help the scar soften and fade quicker. Begin to massage the area one month after the bandages have been removed. To massage apply pressure directly and firmly over the scar with the fingertips and move in a circular motion. Massage the area for a few minutes several times a day. Continue to massage the site for several months.    *Approximately 6-8 weeks after surgery it is not uncommon to see the formation of  tender pimple-like  bump along the scar. This is normal. As the scar continues to  mature and the stitches underneath the skin begin to dissolve, this might occur. Do not pick or squeeze, this will resolve on it s own. Should one break open producing a small amount of drainage, apply Polysporin or Bacitracin ointment a few times a day until the wound is completely healed.    *Numbness in the surgical area is expected. It might take 12-18 months for the feeling to return to normal. During this time sensations of itchiness, tingling and occasional sharp pains might be noted. These feelings are normal and will subside once the nerves have completely healed.     IN CASE OF EMERGENCY: Dr Davis [phone]   If you were seen in [place] call: [phone]  If you were seen in [place] call: [phone]

## 2022-12-09 DIAGNOSIS — I10 HYPERTENSION, UNSPECIFIED TYPE: ICD-10-CM

## 2022-12-09 NOTE — TELEPHONE ENCOUNTER
"Routing refill request to provider for review/approval because:  Labs not current:  Creatinine, potassium  Blood pressure is out of range    Last Written Prescription Date:  9/10/22  Last Fill Quantity: 90,  # refills: 0   Last office visit provider:  12/22/21     Requested Prescriptions   Pending Prescriptions Disp Refills     lisinopril (ZESTRIL) 20 MG tablet [Pharmacy Med Name: LISINOPRIL 20 MG TABLET] 90 tablet 0     Sig: TAKE 1 TABLET BY MOUTH EVERY DAY       ACE Inhibitors (Including Combos) Protocol Failed - 12/9/2022 12:53 AM        Failed - Blood pressure under 140/90 in past 12 months     BP Readings from Last 3 Encounters:   11/30/22 (!) 142/98   12/22/21 125/84   12/21/21 (!) 154/97                 Failed - Normal serum creatinine on file in past 12 months     Recent Labs   Lab Test 06/23/21  0809   CR 0.86       Ok to refill medication if creatinine is low          Failed - Normal serum potassium on file in past 12 months     Recent Labs   Lab Test 06/23/21  0809   POTASSIUM 4.9             Passed - Recent (12 mo) or future (30 days) visit within the authorizing provider's specialty     Patient has had an office visit with the authorizing provider or a provider within the authorizing providers department within the previous 12 mos or has a future within next 30 days. See \"Patient Info\" tab in inbasket, or \"Choose Columns\" in Meds & Orders section of the refill encounter.              Passed - Medication is active on med list        Passed - Patient is age 18 or older             Faith Urban RN 12/09/22 3:44 PM  "

## 2022-12-11 RX ORDER — LISINOPRIL 20 MG/1
TABLET ORAL
Qty: 90 TABLET | Refills: 0 | Status: SHIPPED | OUTPATIENT
Start: 2022-12-11 | End: 2023-03-14

## 2022-12-15 ENCOUNTER — LAB (OUTPATIENT)
Dept: LAB | Facility: CLINIC | Age: 67
End: 2022-12-15
Payer: COMMERCIAL

## 2022-12-15 DIAGNOSIS — E78.1 PURE HYPERGLYCERIDEMIA: ICD-10-CM

## 2022-12-15 DIAGNOSIS — Z12.5 SCREENING PSA (PROSTATE SPECIFIC ANTIGEN): ICD-10-CM

## 2022-12-15 DIAGNOSIS — R73.01 ELEVATED FASTING BLOOD SUGAR: ICD-10-CM

## 2022-12-15 DIAGNOSIS — I10 BENIGN ESSENTIAL HYPERTENSION: ICD-10-CM

## 2022-12-15 DIAGNOSIS — Z00.00 ENCOUNTER FOR MEDICARE ANNUAL WELLNESS EXAM: ICD-10-CM

## 2022-12-15 LAB
ALBUMIN SERPL BCG-MCNC: 4.4 G/DL (ref 3.5–5.2)
ALP SERPL-CCNC: 68 U/L (ref 40–129)
ALT SERPL W P-5'-P-CCNC: 43 U/L (ref 10–50)
ANION GAP SERPL CALCULATED.3IONS-SCNC: 9 MMOL/L (ref 7–15)
AST SERPL W P-5'-P-CCNC: 30 U/L (ref 10–50)
BILIRUB SERPL-MCNC: 0.5 MG/DL
BUN SERPL-MCNC: 21.5 MG/DL (ref 8–23)
CALCIUM SERPL-MCNC: 9.2 MG/DL (ref 8.8–10.2)
CHLORIDE SERPL-SCNC: 103 MMOL/L (ref 98–107)
CHOLEST SERPL-MCNC: 229 MG/DL
CREAT SERPL-MCNC: 1.17 MG/DL (ref 0.67–1.17)
DEPRECATED HCO3 PLAS-SCNC: 26 MMOL/L (ref 22–29)
ERYTHROCYTE [DISTWIDTH] IN BLOOD BY AUTOMATED COUNT: 12.2 % (ref 10–15)
GFR SERPL CREATININE-BSD FRML MDRD: 68 ML/MIN/1.73M2
GLUCOSE SERPL-MCNC: 114 MG/DL (ref 70–99)
HCT VFR BLD AUTO: 50.6 % (ref 40–53)
HDLC SERPL-MCNC: 30 MG/DL
HGB BLD-MCNC: 17.6 G/DL (ref 13.3–17.7)
LDLC SERPL CALC-MCNC: 148 MG/DL
MCH RBC QN AUTO: 32.8 PG (ref 26.5–33)
MCHC RBC AUTO-ENTMCNC: 34.8 G/DL (ref 31.5–36.5)
MCV RBC AUTO: 94 FL (ref 78–100)
NONHDLC SERPL-MCNC: 199 MG/DL
PLATELET # BLD AUTO: 200 10E3/UL (ref 150–450)
POTASSIUM SERPL-SCNC: 4.5 MMOL/L (ref 3.4–5.3)
PROT SERPL-MCNC: 7.1 G/DL (ref 6.4–8.3)
PSA SERPL-MCNC: 2.97 NG/ML (ref 0–4.5)
RBC # BLD AUTO: 5.37 10E6/UL (ref 4.4–5.9)
SODIUM SERPL-SCNC: 138 MMOL/L (ref 136–145)
TRIGL SERPL-MCNC: 254 MG/DL
WBC # BLD AUTO: 5.7 10E3/UL (ref 4–11)

## 2022-12-15 PROCEDURE — G0103 PSA SCREENING: HCPCS

## 2022-12-15 PROCEDURE — 85027 COMPLETE CBC AUTOMATED: CPT

## 2022-12-15 PROCEDURE — 36415 COLL VENOUS BLD VENIPUNCTURE: CPT

## 2022-12-15 PROCEDURE — 80061 LIPID PANEL: CPT

## 2022-12-15 PROCEDURE — 83036 HEMOGLOBIN GLYCOSYLATED A1C: CPT

## 2022-12-15 PROCEDURE — 80053 COMPREHEN METABOLIC PANEL: CPT

## 2022-12-18 DIAGNOSIS — R73.01 ELEVATED FASTING BLOOD SUGAR: Primary | ICD-10-CM

## 2022-12-19 LAB — HBA1C MFR BLD: 5.6 % (ref 0–5.6)

## 2022-12-22 ENCOUNTER — OFFICE VISIT (OUTPATIENT)
Dept: FAMILY MEDICINE | Facility: CLINIC | Age: 67
End: 2022-12-22
Payer: COMMERCIAL

## 2022-12-22 VITALS
RESPIRATION RATE: 16 BRPM | OXYGEN SATURATION: 97 % | SYSTOLIC BLOOD PRESSURE: 163 MMHG | HEIGHT: 70 IN | BODY MASS INDEX: 26.7 KG/M2 | DIASTOLIC BLOOD PRESSURE: 93 MMHG | HEART RATE: 70 BPM | TEMPERATURE: 97.8 F

## 2022-12-22 DIAGNOSIS — I71.43 INFRARENAL ABDOMINAL AORTIC ANEURYSM (AAA) WITHOUT RUPTURE (H): ICD-10-CM

## 2022-12-22 DIAGNOSIS — I10 BENIGN ESSENTIAL HYPERTENSION: ICD-10-CM

## 2022-12-22 DIAGNOSIS — Z87.891 PERSONAL HISTORY OF NICOTINE DEPENDENCE: ICD-10-CM

## 2022-12-22 DIAGNOSIS — D12.6 BENIGN NEOPLASM OF COLON, UNSPECIFIED PART OF COLON: ICD-10-CM

## 2022-12-22 DIAGNOSIS — C61 PROSTATE CANCER (H): ICD-10-CM

## 2022-12-22 DIAGNOSIS — Z00.00 ENCOUNTER FOR ANNUAL WELLNESS VISIT (AWV) IN MEDICARE PATIENT: Primary | ICD-10-CM

## 2022-12-22 DIAGNOSIS — Z00.00 ENCOUNTER FOR MEDICARE ANNUAL WELLNESS EXAM: ICD-10-CM

## 2022-12-22 PROCEDURE — 99406 BEHAV CHNG SMOKING 3-10 MIN: CPT | Performed by: FAMILY MEDICINE

## 2022-12-22 PROCEDURE — 99214 OFFICE O/P EST MOD 30 MIN: CPT | Mod: 25 | Performed by: FAMILY MEDICINE

## 2022-12-22 PROCEDURE — G0438 PPPS, INITIAL VISIT: HCPCS | Performed by: FAMILY MEDICINE

## 2022-12-22 ASSESSMENT — ENCOUNTER SYMPTOMS
CHILLS: 0
WEAKNESS: 0
CONSTIPATION: 0
JOINT SWELLING: 0
SORE THROAT: 0
FREQUENCY: 0
HEMATOCHEZIA: 0
MYALGIAS: 0
DIARRHEA: 0
HEMATURIA: 0
PALPITATIONS: 0
COUGH: 0
EYE PAIN: 0
FEVER: 0
SHORTNESS OF BREATH: 0
HEARTBURN: 0
HEADACHES: 0
ABDOMINAL PAIN: 0
DIZZINESS: 0
NERVOUS/ANXIOUS: 0
ARTHRALGIAS: 0
DYSURIA: 0
NAUSEA: 0
PARESTHESIAS: 0

## 2022-12-22 ASSESSMENT — ACTIVITIES OF DAILY LIVING (ADL): CURRENT_FUNCTION: NO ASSISTANCE NEEDED

## 2022-12-22 NOTE — PROGRESS NOTES
"SUBJECTIVE:   Soham is a 67 year old who presents for Preventive Visit.    Patient has been advised of split billing requirements and indicates understanding: Yes  Are you in the first 12 months of your Medicare coverage?  No    Healthy Habits:     In general, how would you rate your overall health?  Good    Frequency of exercise:  2-3 days/week    Do you usually eat at least 4 servings of fruit and vegetables a day, include whole grains    & fiber and avoid regularly eating high fat or \"junk\" foods?  Yes    Taking medications regularly:  Yes    Medication side effects:  None    Ability to successfully perform activities of daily living:  No assistance needed    Home Safety:  No safety concerns identified    Hearing Impairment:  No hearing concerns    In the past 6 months, have you been bothered by leaking of urine?  No    In general, how would you rate your overall mental or emotional health?  Good      PHQ-2 Total Score: 0    Additional concerns today:  No      Have you ever done Advance Care Planning? No, advance care planning information given to patient to review.  Advanced care planning was discussed at today's visit.       Fall risk  Fallen 2 or more times in the past year?: No  Any fall with injury in the past year?: No    Cognitive Screening   1) Repeat 3 items (Leader, Season, Table)    2) Clock draw: NORMAL  3) 3 item recall: Recalls 1 object   Results: NORMAL clock, 1-2 items recalled: COGNITIVE IMPAIRMENT LESS LIKELY    Mini-CogTM Copyright NICOLE Mata. Licensed by the author for use in [facility]; reprinted with permission (sae@.[facility]). All rights reserved.      Do you have sleep apnea, excessive snoring or daytime drowsiness?: no    Reviewed and updated as needed this visit by clinical staff   Tobacco  Allergies  Meds              Reviewed and updated as needed this visit by Provider                 Social History     Tobacco Use     Smoking status: Every Day     Packs/day: 0.25     " Types: Cigarettes     Smokeless tobacco: Never   Substance Use Topics     Alcohol use: Yes     Comment: Alcoholic Drinks/day: 4 drinks per wk     cigs 1/4 ppd  Alcohol 4 drinks per week max    Alcohol Use 12/21/2022   Prescreen: >3 drinks/day or >7 drinks/week? No         Current providers sharing in care for this patient include:   Patient Care Team:  Brett Groves MD as PCP - General (Family Practice)  Brett Groves MD as Assigned PCP  Edenilson Davis MD as Assigned Surgical Provider    The following health maintenance items are reviewed in Epic and correct as of today:  Health Maintenance   Topic Date Due     ANNUAL REVIEW OF  ORDERS  Never done     HEPATITIS C SCREENING  Never done     ZOSTER IMMUNIZATION (1 of 2) Never done     MEDICARE ANNUAL WELLNESS VISIT  01/22/2021     LUNG CANCER SCREENING  01/06/2023     NICOTINE/TOBACCO CESSATION COUNSELING Q 1 YR  12/22/2023     FALL RISK ASSESSMENT  12/22/2023     DTAP/TDAP/TD IMMUNIZATION (4 - Td or Tdap) 09/12/2026     COLORECTAL CANCER SCREENING  03/16/2027     LIPID  12/15/2027     ADVANCE CARE PLANNING  12/22/2027     PHQ-2 (once per calendar year)  Completed     INFLUENZA VACCINE  Completed     Pneumococcal Vaccine: 65+ Years  Completed     AORTIC ANEURYSM SCREENING (SYSTEM ASSIGNED)  Completed     COVID-19 Vaccine  Completed     IPV IMMUNIZATION  Aged Out     MENINGITIS IMMUNIZATION  Aged Out       Review of Systems   Constitutional: Negative for chills and fever.   HENT: Negative for congestion, ear pain, hearing loss and sore throat.    Eyes: Negative for pain and visual disturbance.   Respiratory: Negative for cough and shortness of breath.    Cardiovascular: Negative for chest pain, palpitations and peripheral edema.   Gastrointestinal: Negative for abdominal pain, constipation, diarrhea, heartburn, hematochezia and nausea.   Genitourinary: Negative for dysuria, frequency, genital sores, hematuria, impotence, penile discharge and urgency.  "  Musculoskeletal: Negative for arthralgias, joint swelling and myalgias.   Skin: Negative for rash.   Neurological: Negative for dizziness, weakness, headaches and paresthesias.   Psychiatric/Behavioral: Negative for mood changes. The patient is not nervous/anxious.        OBJECTIVE:   BP (!) 134/102 (BP Location: Left arm, Patient Position: Sitting, Cuff Size: Adult Large)   Pulse 70   Temp 97.8  F (36.6  C) (Oral)   Resp 16   Ht 1.773 m (5' 9.8\")   SpO2 97%   BMI 26.70 kg/m   Estimated body mass index is 26.7 kg/m  as calculated from the following:    Height as of this encounter: 1.773 m (5' 9.8\").    Weight as of 12/22/21: 83.9 kg (185 lb).     Wt Readings from Last 4 Encounters:   12/22/21 83.9 kg (185 lb)   12/14/21 83.4 kg (183 lb 14.4 oz)   07/01/21 85.9 kg (189 lb 6.4 oz)   06/23/21 86.4 kg (190 lb 6.4 oz)     Physical Exam  GENERAL: healthy, alert and no distress  EYES: Eyes grossly normal to inspection, PERRL and conjunctivae and sclerae normal  HENT: ear canals and TM's normal, nose and mouth without ulcers or lesions  NECK: no adenopathy, no asymmetry, masses, or scars and thyroid normal to palpation  RESP: lungs clear to auscultation - no rales, rhonchi or wheezes  CV: regular rate and rhythm, normal S1 S2, no S3 or S4, no murmur, click or rub, no peripheral edema and peripheral pulses strong  ABDOMEN: soft, nontender, no hepatosplenomegaly, no masses and bowel sounds normal  RECTAL: rectal exam at [facility] with every 6 month PSAs for active surveilance of prostate cancer.  MS: no gross musculoskeletal defects noted, no edema  SKIN: no suspicious lesions or rashes  NEURO: Normal strength and tone, mentation intact and speech normal  PSYCH: mentation appears normal, affect normal/bright      ASSESSMENT / PLAN:       ICD-10-CM    1. Encounter for annual wellness visit (AWV) in Medicare patient  Z00.00       2. Benign essential hypertension, not ideally controlled, check home BPs I10       3. " Prostate cancer (H), continue active surveilance through Urology C61       4. Benign neoplasm of colon, unspecified part of colon  D12.6       5. Infrarenal abdominal aortic aneurysm (AAA) without rupture  I71.43       6. Personal history of nicotine dependence, encourage cessation, recommend quit date and nicotine losenge trial. Z87.891 CT Chest Lung Cancer Scrn Low Dose wo          PLAN:        Check with insurance on Shingrix coverage    Colonoscopy due in March 2027    Repeat ultrasound of the abdominal aorta in Jan 2025  (was 3.9 cm)    CT low dose of the chest in Jan 2023 for lung cancer screening    Home BP monitoring.  OMRON monitor.  Bring it in to a nurse BP check.  If home BPS running > 140/90 then we need to change your meds    Continue every 6 month prostate cancer surveilance with [facility]    Set a smoking QUIT DATE!  Try the 4 mg nicotine losenge as an aid to quitting.  > 3 min spent in the discussion of smoking cessation.      Patient has been advised of split billing requirements and indicates understanding: Yes      COUNSELING:  Reviewed preventive health counseling, as reflected in patient instructions       Regular exercise       Healthy diet/nutrition       Colon cancer screening       Prostate cancer screening        He reports that he has been smoking cigarettes. He has been smoking an average of .75 packs per day. He has never used smokeless tobacco.  Nicotine/Tobacco Cessation Plan:   recommend the 4 mg nicotine losenge      Appropriate preventive services were discussed with this patient, including applicable screening as appropriate for cardiovascular disease, diabetes, osteopenia/osteoporosis, and glaucoma.  As appropriate for age/gender, discussed screening for colorectal cancer, prostate cancer, breast cancer, and cervical cancer. Checklist reviewing preventive services available has been given to the patient.    Reviewed patients plan of care and provided an AVS. The Basic Care Plan  (routine screening as documented in Health Maintenance) for Gualberto meets the Care Plan requirement. This Care Plan has been established and reviewed with the Patient.      Brett Groves MD  [facility]    Identified Health Risks:

## 2022-12-22 NOTE — PATIENT INSTRUCTIONS
Check with insurance on Shingrix coverage    Colonoscopy due in March 2027    Repeat ultrasound of the abdominal aorta in Jan 2025  (was 3.9 cm)    CT low dose of the chest in Jan 2023 for lung cancer screening    Home BP monitoring.  OMRON monitor.  Bring it in to a nurse BP check.  If home BPS running > 140/90 then we need to change your meds    Continue every 6 month prostate cancer surveilance with [facility]    Try the 4 mg nicotine losenge as an aid to quitting.  Set a smoking QUIT DATE!  Patient Education   Personalized Prevention Plan  You are due for the preventive services outlined below.  Your care team is available to assist you in scheduling these services.  If you have already completed any of these items, please share that information with your care team to update in your medical record.  Health Maintenance Due   Topic Date Due     ANNUAL REVIEW OF HM ORDERS  Never done     Hepatitis C Screening  Never done     Zoster (Shingles) Vaccine (1 of 2) Never done     LUNG CANCER SCREENING  01/06/2023

## 2023-01-02 DIAGNOSIS — I10 BENIGN ESSENTIAL HYPERTENSION: ICD-10-CM

## 2023-01-02 RX ORDER — HYDROCHLOROTHIAZIDE 12.5 MG/1
CAPSULE ORAL
Qty: 90 CAPSULE | Refills: 0 | Status: SHIPPED | OUTPATIENT
Start: 2023-01-02 | End: 2023-04-01

## 2023-01-02 NOTE — TELEPHONE ENCOUNTER
"Routing refill request to provider for review/approval because:  Blood pressure under 140/90 in the past 12 months.    Last Written Prescription Date:  10/6/2022  Last Fill Quantity: 90,  # refills: 0   Last office visit provider:  10/22/2022     Requested Prescriptions   Pending Prescriptions Disp Refills     hydrochlorothiazide (MICROZIDE) 12.5 MG capsule [Pharmacy Med Name: HYDROCHLOROTHIAZIDE 12.5 MG CP] 90 capsule 0     Sig: TAKE 1 CAPSULE BY MOUTH EVERY DAY       Diuretics (Including Combos) Protocol Failed - 1/2/2023  2:07 PM        Failed - Blood pressure under 140/90 in past 12 months     BP Readings from Last 3 Encounters:   12/22/22 (!) 163/93   11/30/22 (!) 142/98   12/22/21 125/84                 Passed - Recent (12 mo) or future (30 days) visit within the authorizing provider's specialty     Patient has had an office visit with the authorizing provider or a provider within the authorizing providers department within the previous 12 mos or has a future within next 30 days. See \"Patient Info\" tab in inbasket, or \"Choose Columns\" in Meds & Orders section of the refill encounter.              Passed - Medication is active on med list        Passed - Patient is age 18 or older        Passed - Normal serum creatinine on file in past 12 months     Recent Labs   Lab Test 12/15/22  0810   CR 1.17              Passed - Normal serum potassium on file in past 12 months     Recent Labs   Lab Test 12/15/22  0810   POTASSIUM 4.5                    Passed - Normal serum sodium on file in past 12 months     Recent Labs   Lab Test 12/15/22  0810                      Kylee Gordon RN 01/02/23 2:08 PM  "

## 2023-01-05 ENCOUNTER — HOSPITAL ENCOUNTER (OUTPATIENT)
Dept: CT IMAGING | Facility: HOSPITAL | Age: 68
Discharge: HOME | End: 2023-01-05
Attending: FAMILY MEDICINE | Admitting: FAMILY MEDICINE
Payer: COMMERCIAL

## 2023-01-05 DIAGNOSIS — Z87.891 PERSONAL HISTORY OF NICOTINE DEPENDENCE: ICD-10-CM

## 2023-01-05 PROCEDURE — 71271 CT THORAX LUNG CANCER SCR C-: CPT

## 2023-03-13 DIAGNOSIS — I10 HYPERTENSION, UNSPECIFIED TYPE: ICD-10-CM

## 2023-03-14 RX ORDER — LISINOPRIL 20 MG/1
TABLET ORAL
Qty: 90 TABLET | Refills: 2 | Status: SHIPPED | OUTPATIENT
Start: 2023-03-14 | End: 2023-12-07

## 2023-03-14 NOTE — TELEPHONE ENCOUNTER
"Routing refill request to provider for review/approval because:  Blood pressure failed     Last Written Prescription Date:  12/11/2022  Last Fill Quantity: 90,  # refills: 0   Last office visit provider:  12/22/2022     Requested Prescriptions   Pending Prescriptions Disp Refills     lisinopril (ZESTRIL) 20 MG tablet [Pharmacy Med Name: LISINOPRIL 20 MG TABLET] 90 tablet 0     Sig: TAKE 1 TABLET BY MOUTH EVERY DAY       ACE Inhibitors (Including Combos) Protocol Failed - 3/14/2023  1:29 PM        Failed - Blood pressure under 140/90 in past 12 months     BP Readings from Last 3 Encounters:   12/22/22 (!) 163/93   11/30/22 (!) 142/98   12/22/21 125/84                 Passed - Recent (12 mo) or future (30 days) visit within the authorizing provider's specialty     Patient has had an office visit with the authorizing provider or a provider within the authorizing providers department within the previous 12 mos or has a future within next 30 days. See \"Patient Info\" tab in inbasket, or \"Choose Columns\" in Meds & Orders section of the refill encounter.              Passed - Medication is active on med list        Passed - Patient is age 18 or older        Passed - Normal serum creatinine on file in past 12 months     Recent Labs   Lab Test 12/15/22  0810   CR 1.17       Ok to refill medication if creatinine is low          Passed - Normal serum potassium on file in past 12 months     Recent Labs   Lab Test 12/15/22  0810   POTASSIUM 4.5                  Rebekah Dudley RN 03/14/23 1:29 PM  "

## 2023-04-01 DIAGNOSIS — I10 BENIGN ESSENTIAL HYPERTENSION: ICD-10-CM

## 2023-04-01 RX ORDER — HYDROCHLOROTHIAZIDE 12.5 MG/1
CAPSULE ORAL
Qty: 90 CAPSULE | Refills: 2 | Status: SHIPPED | OUTPATIENT
Start: 2023-04-01 | End: 2024-01-08

## 2023-04-01 NOTE — TELEPHONE ENCOUNTER
"Routing refill request to provider for review/approval because:  B/P out of range    Last Written Prescription Date:  1/2/23  Last Fill Quantity: 90,  # refills: 0   Last office visit provider:  12/22/22     Requested Prescriptions   Pending Prescriptions Disp Refills     hydrochlorothiazide (MICROZIDE) 12.5 MG capsule [Pharmacy Med Name: HYDROCHLOROTHIAZIDE 12.5 MG CP] 90 capsule 0     Sig: TAKE 1 CAPSULE BY MOUTH EVERY DAY       Diuretics (Including Combos) Protocol Failed - 4/1/2023  7:20 AM        Failed - Blood pressure under 140/90 in past 12 months     BP Readings from Last 3 Encounters:   12/22/22 (!) 163/93   11/30/22 (!) 142/98   12/22/21 125/84                 Passed - Recent (12 mo) or future (30 days) visit within the authorizing provider's specialty     Patient has had an office visit with the authorizing provider or a provider within the authorizing providers department within the previous 12 mos or has a future within next 30 days. See \"Patient Info\" tab in inbasket, or \"Choose Columns\" in Meds & Orders section of the refill encounter.              Passed - Medication is active on med list        Passed - Patient is age 18 or older        Passed - Normal serum creatinine on file in past 12 months     Recent Labs   Lab Test 12/15/22  0810   CR 1.17              Passed - Normal serum potassium on file in past 12 months     Recent Labs   Lab Test 12/15/22  0810   POTASSIUM 4.5                    Passed - Normal serum sodium on file in past 12 months     Recent Labs   Lab Test 12/15/22  0810                      Barbara Park RN 04/01/23 4:15 PM  "

## 2023-05-10 ENCOUNTER — TRANSFERRED RECORDS (OUTPATIENT)
Dept: HEALTH INFORMATION MANAGEMENT | Facility: CLINIC | Age: 68
End: 2023-05-10
Payer: COMMERCIAL

## 2023-09-13 ENCOUNTER — OFFICE VISIT (OUTPATIENT)
Dept: FAMILY MEDICINE | Facility: CLINIC | Age: 68
End: 2023-09-13
Payer: COMMERCIAL

## 2023-09-13 VITALS
SYSTOLIC BLOOD PRESSURE: 136 MMHG | HEIGHT: 70 IN | WEIGHT: 206 LBS | DIASTOLIC BLOOD PRESSURE: 86 MMHG | BODY MASS INDEX: 29.49 KG/M2 | RESPIRATION RATE: 20 BRPM | TEMPERATURE: 98.2 F | HEART RATE: 64 BPM | OXYGEN SATURATION: 95 %

## 2023-09-13 DIAGNOSIS — Z01.818 PREOPERATIVE EXAMINATION: Primary | ICD-10-CM

## 2023-09-13 LAB
ANION GAP SERPL CALCULATED.3IONS-SCNC: 11 MMOL/L (ref 7–15)
ATRIAL RATE - MUSE: 60 BPM
BUN SERPL-MCNC: 18.7 MG/DL (ref 8–23)
CALCIUM SERPL-MCNC: 9.5 MG/DL (ref 8.8–10.2)
CHLORIDE SERPL-SCNC: 101 MMOL/L (ref 98–107)
CREAT SERPL-MCNC: 0.95 MG/DL (ref 0.67–1.17)
DEPRECATED HCO3 PLAS-SCNC: 22 MMOL/L (ref 22–29)
DIASTOLIC BLOOD PRESSURE - MUSE: NORMAL MMHG
EGFRCR SERPLBLD CKD-EPI 2021: 87 ML/MIN/1.73M2
ERYTHROCYTE [DISTWIDTH] IN BLOOD BY AUTOMATED COUNT: 12.3 % (ref 10–15)
GLUCOSE SERPL-MCNC: 112 MG/DL (ref 70–99)
HCT VFR BLD AUTO: 50.7 % (ref 40–53)
HGB BLD-MCNC: 17.8 G/DL (ref 13.3–17.7)
INTERPRETATION ECG - MUSE: NORMAL
MCH RBC QN AUTO: 32.9 PG (ref 26.5–33)
MCHC RBC AUTO-ENTMCNC: 35.1 G/DL (ref 31.5–36.5)
MCV RBC AUTO: 94 FL (ref 78–100)
P AXIS - MUSE: 56 DEGREES
PLATELET # BLD AUTO: 220 10E3/UL (ref 150–450)
POTASSIUM SERPL-SCNC: 4.3 MMOL/L (ref 3.4–5.3)
PR INTERVAL - MUSE: 182 MS
QRS DURATION - MUSE: 88 MS
QT - MUSE: 390 MS
QTC - MUSE: 390 MS
R AXIS - MUSE: 39 DEGREES
RBC # BLD AUTO: 5.41 10E6/UL (ref 4.4–5.9)
SODIUM SERPL-SCNC: 134 MMOL/L (ref 136–145)
SYSTOLIC BLOOD PRESSURE - MUSE: NORMAL MMHG
T AXIS - MUSE: 57 DEGREES
VENTRICULAR RATE- MUSE: 60 BPM
WBC # BLD AUTO: 7 10E3/UL (ref 4–11)

## 2023-09-13 PROCEDURE — 80048 BASIC METABOLIC PNL TOTAL CA: CPT | Performed by: FAMILY MEDICINE

## 2023-09-13 PROCEDURE — 36415 COLL VENOUS BLD VENIPUNCTURE: CPT | Performed by: FAMILY MEDICINE

## 2023-09-13 PROCEDURE — 93005 ELECTROCARDIOGRAM TRACING: CPT | Performed by: FAMILY MEDICINE

## 2023-09-13 PROCEDURE — 99214 OFFICE O/P EST MOD 30 MIN: CPT | Mod: 25 | Performed by: FAMILY MEDICINE

## 2023-09-13 PROCEDURE — 93010 ELECTROCARDIOGRAM REPORT: CPT | Performed by: INTERNAL MEDICINE

## 2023-09-13 PROCEDURE — 85027 COMPLETE CBC AUTOMATED: CPT | Performed by: FAMILY MEDICINE

## 2023-09-13 NOTE — PROGRESS NOTES
[facility]  [address] [facility] [address]  Phone: [phone]  Fax: [phone]  Primary Provider: Brtet Groves  Pre-op Performing Provider: HENNA MALDONADO      PREOPERATIVE EVALUATION:  Today's date: 9/13/2023    Gualberto Wood is a 68 year old male who presents for a preoperative evaluation.      9/13/2023    10:28 AM   Additional Questions   Roomed by Clari ORTA CMA       Surgical Information:  Surgery/Procedure: Removal of teeth/root tips, concurrent dental implant placement, bone grafting and immediate attachment of implant supported bridge(s)   Surgery Location:  [facility]  Surgeon:   Surgery Date: 10/4/2023  Time of Surgery: 6:30 AM  Where patient plans to recover: At home with family  Fax number for surgical facility: [phone]    Assessment & Plan     The proposed surgical procedure is considered INTERMEDIATE risk.    Preoperative examination  Proceed with dental procedure  - EKG 12-lead, tracing only  - CBC with platelets; Future  - Basic metabolic panel  (Ca, Cl, CO2, Creat, Gluc, K, Na, BUN); Future  - CBC with platelets  - Basic metabolic panel  (Ca, Cl, CO2, Creat, Gluc, K, Na, BUN)            - No identified additional risk factors other than previously addressed    Antiplatelet or Anticoagulation Medication Instructions:   - aspirin: Discontinue aspirin 7-10 days prior to procedure to reduce bleeding risk. It should be resumed postoperatively.     Additional Medication Instructions:  Patient will hold lisinopril day of procedure but take hydrochlorothiazide with a sip of water    RECOMMENDATION:  APPROVAL GIVEN to proceed with proposed procedure, without further diagnostic evaluation.      Subjective       HPI related to upcoming procedure: Patient is scheduled for upcoming dental procedure for multiple implants and bridge.  Patient has had previous surgeries without complication.  No history of anesthesia problems.  No history of  blood clots.  Patient expresses no cardiovascular symptoms of concern.  He is aware of what medications to hold prior to surgery.  EKG showing possible age-indeterminate infarct but we have no comparison EKG no prior-no acute ST or T wave abnormalities noted on EKG.        9/6/2023     4:24 PM   Preop Questions   1. Have you ever had a heart attack or stroke? No   2. Have you ever had surgery on your heart or blood vessels, such as a stent placement, a coronary artery bypass, or surgery on an artery in your head, neck, heart, or legs? No   3. Do you have chest pain with activity? No   4. Do you have a history of  heart failure? No   5. Do you currently have a cold, bronchitis or symptoms of other infection? No   6. Do you have a cough, shortness of breath, or wheezing? No   7. Do you or anyone in your family have previous history of blood clots? No   8. Do you or does anyone in your family have a serious bleeding problem such as prolonged bleeding following surgeries or cuts? No   9. Have you ever had problems with anemia or been told to take iron pills? No   10. Have you had any abnormal blood loss such as black, tarry or bloody stools? No   11. Have you ever had a blood transfusion? No   12. Are you willing to have a blood transfusion if it is medically needed before, during, or after your surgery? Yes   13. Have you or any of your relatives ever had problems with anesthesia? No   14. Do you have sleep apnea, excessive snoring or daytime drowsiness? No   15. Do you have any artifical heart valves or other implanted medical devices like a pacemaker, defibrillator, or continuous glucose monitor? No   16. Do you have artificial joints? No   17. Are you allergic to latex? No           Review of Systems  Constitutional, neuro, ENT, endocrine, pulmonary, cardiac, gastrointestinal, genitourinary, musculoskeletal, integument and psychiatric systems are negative, except as otherwise noted.    Patient Active Problem List     "Diagnosis Date Noted    Infrarenal abdominal aortic aneurysm (AAA) without rupture (H) 12/22/2022     Priority: Medium    Skin lesion 12/14/2021     Priority: Medium    Prostate cancer (H) 11/21/2021     Priority: Medium     On biopsy 10-29-21      Benign essential hypertension 01/29/2020     Priority: Medium    Benign Adenomatous Polyp Of The Large Intestine      Priority: Medium     Colonoscopy 1-4-19 with finding a sessile serrated adenoma and tubular   adenoma (consistent with \"advanced\" due to 10 mm size).  Repeat colonoscopy in 3 years.    3-16-22 tubular adenomas times two.  Repeat in 5 years.        Obesity 09/12/2016     Priority: Medium    Essential Hypertriglyceridemia      Priority: Medium     Created by Conversion        Nicotine Dependence      Priority: Medium     Created by Conversion        Complete Tear Of The Anterior Cruciate Ligament Of The Knee      Priority: Medium     Created by Conversion          Past Medical History:   Diagnosis Date    Squamous cell carcinoma of skin, unspecified      Past Surgical History:   Procedure Laterality Date    HC KNEE SCOPE, DIAGNOSTIC      Description: Arthroscopy Knee Left;  Recorded: 10/25/2010;  Comments: ACL reconstruction in 1991, repair of meniscus 10/2010    OTHER SURGICAL HISTORY  10/2012    bowel obstruction     Current Outpatient Medications   Medication Sig Dispense Refill    aspirin 81 MG EC tablet [ASPIRIN 81 MG EC TABLET] Take 81 mg by mouth daily.      cholecalciferol, vitamin D3, (VITAMIN D3 ORAL) [CHOLECALCIFEROL, VITAMIN D3, (VITAMIN D3 ORAL)] Take 1 tablet by mouth daily.      DOCOSAHEXANOIC ACID/EPA (FISH OIL ORAL) [DOCOSAHEXANOIC ACID/EPA (FISH OIL ORAL)] Take 1 capsule by mouth daily.      hydrochlorothiazide (MICROZIDE) 12.5 MG capsule TAKE 1 CAPSULE BY MOUTH EVERY DAY 90 capsule 2    lisinopril (ZESTRIL) 20 MG tablet TAKE 1 TABLET BY MOUTH EVERY DAY 90 tablet 2       No Known Allergies     Social History     Tobacco Use    Smoking " "status: Every Day     Packs/day: 0.75     Types: Cigarettes    Smokeless tobacco: Never   Substance Use Topics    Alcohol use: Yes     Comment: Alcoholic Drinks/day: 4 drinks per wk       History   Drug Use No         Objective     BP (!) 144/93 (BP Location: Left arm, Patient Position: Sitting, Cuff Size: Adult Large)   Pulse 68   Temp 98.2  F (36.8  C) (Oral)   Resp 20   Ht 1.778 m (5' 10\")   Wt 93.4 kg (206 lb)   SpO2 95%   BMI 29.56 kg/m      Physical Exam    GENERAL APPEARANCE: healthy, alert and no distress     EYES: EOMI,  PERRL     HENT: ear canals and TM's normal and nose and mouth without ulcers or lesions     RESP: lungs clear to auscultation - no rales, rhonchi or wheezes     CV: regular rates and rhythm, normal S1 S2, no S3 or S4 and no murmur, click or rub     ABDOMEN: bowel sounds normal     MS: extremities normal- no gross deformities noted, no evidence of inflammation in joints, FROM in all extremities.     NEURO: Normal strength and tone, sensory exam grossly normal, mentation intact and speech normal     PSYCH: mentation appears normal. and affect normal/bright    Recent Labs   Lab Test 12/15/22  0810   HGB 17.6         POTASSIUM 4.5   CR 1.17   A1C 5.6        Diagnostics:  Labs pending at this time.  Results will be reviewed when available.   EKG: appears normal, NSR, normal axis, normal intervals, no acute ST/T changes c/w ischemia, no LVH by voltage criteria, there are no prior tracings available, age-indeterminate possible septal infarct but no comparison    Revised Cardiac Risk Index (RCRI):  The patient has the following serious cardiovascular risks for perioperative complications:   - No serious cardiac risks = 0 points     RCRI Interpretation: 0 points: Class I (very low risk - 0.4% complication rate)         Signed Electronically by: Tucker Delvalle MD  Copy of this evaluation report is provided to requesting physician.      "

## 2023-09-13 NOTE — PATIENT INSTRUCTIONS
Hold aspirin and Vit D - one week prior  Take blood pressure medications up until the day of surgery- day of surgery just take hydrochlorothiazide with sip of water- hold lisinopril

## 2023-11-10 ENCOUNTER — PATIENT OUTREACH (OUTPATIENT)
Dept: GASTROENTEROLOGY | Facility: CLINIC | Age: 68
End: 2023-11-10
Payer: COMMERCIAL

## 2023-11-14 ENCOUNTER — TRANSFERRED RECORDS (OUTPATIENT)
Dept: HEALTH INFORMATION MANAGEMENT | Facility: CLINIC | Age: 68
End: 2023-11-14
Payer: COMMERCIAL

## 2023-12-07 DIAGNOSIS — I10 HYPERTENSION, UNSPECIFIED TYPE: ICD-10-CM

## 2023-12-07 RX ORDER — LISINOPRIL 20 MG/1
TABLET ORAL
Qty: 90 TABLET | Refills: 1 | Status: SHIPPED | OUTPATIENT
Start: 2023-12-07 | End: 2024-09-17

## 2023-12-26 ASSESSMENT — ENCOUNTER SYMPTOMS
HEMATURIA: 0
EYE PAIN: 0
HEADACHES: 0
FREQUENCY: 0
JOINT SWELLING: 0
WEAKNESS: 0
CHILLS: 0
PARESTHESIAS: 0
COUGH: 0
MYALGIAS: 0
SORE THROAT: 0
SHORTNESS OF BREATH: 0
NAUSEA: 0
ARTHRALGIAS: 0
DIARRHEA: 0
CONSTIPATION: 0
PALPITATIONS: 0
NERVOUS/ANXIOUS: 0
HEMATOCHEZIA: 0
DIZZINESS: 0
FEVER: 0
ABDOMINAL PAIN: 0
HEARTBURN: 0
DYSURIA: 0

## 2023-12-26 ASSESSMENT — ACTIVITIES OF DAILY LIVING (ADL): CURRENT_FUNCTION: NO ASSISTANCE NEEDED

## 2023-12-27 ENCOUNTER — OFFICE VISIT (OUTPATIENT)
Dept: FAMILY MEDICINE | Facility: CLINIC | Age: 68
End: 2023-12-27
Payer: COMMERCIAL

## 2023-12-27 VITALS
RESPIRATION RATE: 18 BRPM | HEART RATE: 62 BPM | DIASTOLIC BLOOD PRESSURE: 95 MMHG | HEIGHT: 70 IN | BODY MASS INDEX: 29.63 KG/M2 | SYSTOLIC BLOOD PRESSURE: 132 MMHG | OXYGEN SATURATION: 96 % | WEIGHT: 207 LBS

## 2023-12-27 DIAGNOSIS — R73.01 ELEVATED FASTING BLOOD SUGAR: Primary | ICD-10-CM

## 2023-12-27 DIAGNOSIS — Z00.00 ENCOUNTER FOR MEDICARE ANNUAL WELLNESS EXAM: ICD-10-CM

## 2023-12-27 DIAGNOSIS — Z87.891 FORMER CIGARETTE SMOKER: ICD-10-CM

## 2023-12-27 DIAGNOSIS — I10 BENIGN ESSENTIAL HYPERTENSION: ICD-10-CM

## 2023-12-27 DIAGNOSIS — Z11.59 ENCOUNTER FOR SCREENING FOR OTHER VIRAL DISEASES: ICD-10-CM

## 2023-12-27 DIAGNOSIS — C61 PROSTATE CANCER (H): ICD-10-CM

## 2023-12-27 DIAGNOSIS — I71.43 INFRARENAL ABDOMINAL AORTIC ANEURYSM (AAA) WITHOUT RUPTURE (H): ICD-10-CM

## 2023-12-27 DIAGNOSIS — D12.6 BENIGN NEOPLASM OF COLON, UNSPECIFIED PART OF COLON: ICD-10-CM

## 2023-12-27 DIAGNOSIS — K74.60 DIFFUSE NODULAR CIRRHOSIS OF LIVER (H): ICD-10-CM

## 2023-12-27 LAB
ALBUMIN SERPL BCG-MCNC: 4.4 G/DL (ref 3.5–5.2)
ALP SERPL-CCNC: 66 U/L (ref 40–150)
ALT SERPL W P-5'-P-CCNC: 54 U/L (ref 0–70)
ANION GAP SERPL CALCULATED.3IONS-SCNC: 11 MMOL/L (ref 7–15)
AST SERPL W P-5'-P-CCNC: 35 U/L (ref 0–45)
BILIRUB SERPL-MCNC: 0.6 MG/DL
BUN SERPL-MCNC: 19.7 MG/DL (ref 8–23)
CALCIUM SERPL-MCNC: 9.8 MG/DL (ref 8.8–10.2)
CHLORIDE SERPL-SCNC: 102 MMOL/L (ref 98–107)
CHOLEST SERPL-MCNC: 258 MG/DL
CREAT SERPL-MCNC: 0.89 MG/DL (ref 0.67–1.17)
DEPRECATED HCO3 PLAS-SCNC: 22 MMOL/L (ref 22–29)
EGFRCR SERPLBLD CKD-EPI 2021: >90 ML/MIN/1.73M2
ERYTHROCYTE [DISTWIDTH] IN BLOOD BY AUTOMATED COUNT: 13.1 % (ref 10–15)
FASTING STATUS PATIENT QL REPORTED: YES
GLUCOSE SERPL-MCNC: 118 MG/DL (ref 70–99)
HBV SURFACE AG SERPL QL IA: NONREACTIVE
HCT VFR BLD AUTO: 49.6 % (ref 40–53)
HCV AB SERPL QL IA: NONREACTIVE
HDLC SERPL-MCNC: 36 MG/DL
HGB BLD-MCNC: 17.3 G/DL (ref 13.3–17.7)
LDLC SERPL CALC-MCNC: 167 MG/DL
MCH RBC QN AUTO: 33 PG (ref 26.5–33)
MCHC RBC AUTO-ENTMCNC: 34.9 G/DL (ref 31.5–36.5)
MCV RBC AUTO: 95 FL (ref 78–100)
NONHDLC SERPL-MCNC: 222 MG/DL
PLATELET # BLD AUTO: 225 10E3/UL (ref 150–450)
POTASSIUM SERPL-SCNC: 4.3 MMOL/L (ref 3.4–5.3)
PROT SERPL-MCNC: 7.6 G/DL (ref 6.4–8.3)
RBC # BLD AUTO: 5.25 10E6/UL (ref 4.4–5.9)
SODIUM SERPL-SCNC: 135 MMOL/L (ref 135–145)
TRIGL SERPL-MCNC: 277 MG/DL
WBC # BLD AUTO: 6 10E3/UL (ref 4–11)

## 2023-12-27 PROCEDURE — G0439 PPPS, SUBSEQ VISIT: HCPCS | Performed by: FAMILY MEDICINE

## 2023-12-27 PROCEDURE — 85027 COMPLETE CBC AUTOMATED: CPT | Performed by: FAMILY MEDICINE

## 2023-12-27 PROCEDURE — 80061 LIPID PANEL: CPT | Performed by: FAMILY MEDICINE

## 2023-12-27 PROCEDURE — 36415 COLL VENOUS BLD VENIPUNCTURE: CPT | Performed by: FAMILY MEDICINE

## 2023-12-27 PROCEDURE — 86803 HEPATITIS C AB TEST: CPT | Performed by: FAMILY MEDICINE

## 2023-12-27 PROCEDURE — 87340 HEPATITIS B SURFACE AG IA: CPT | Performed by: FAMILY MEDICINE

## 2023-12-27 PROCEDURE — 99214 OFFICE O/P EST MOD 30 MIN: CPT | Mod: 25 | Performed by: FAMILY MEDICINE

## 2023-12-27 PROCEDURE — 80053 COMPREHEN METABOLIC PANEL: CPT | Performed by: FAMILY MEDICINE

## 2023-12-27 PROCEDURE — 83036 HEMOGLOBIN GLYCOSYLATED A1C: CPT | Mod: GZ | Performed by: FAMILY MEDICINE

## 2023-12-27 ASSESSMENT — ENCOUNTER SYMPTOMS
MYALGIAS: 0
FREQUENCY: 0
PALPITATIONS: 0
JOINT SWELLING: 0
WEAKNESS: 0
HEADACHES: 0
COUGH: 0
PARESTHESIAS: 0
CONSTIPATION: 0
CHILLS: 0
HEMATURIA: 0
DIARRHEA: 0
ABDOMINAL PAIN: 0
ARTHRALGIAS: 0
FEVER: 0
HEMATOCHEZIA: 0
NAUSEA: 0
DYSURIA: 0
EYE PAIN: 0
SORE THROAT: 0
NERVOUS/ANXIOUS: 0
SHORTNESS OF BREATH: 0
DIZZINESS: 0
HEARTBURN: 0

## 2023-12-27 ASSESSMENT — ACTIVITIES OF DAILY LIVING (ADL): CURRENT_FUNCTION: NO ASSISTANCE NEEDED

## 2023-12-27 NOTE — LETTER
December 28, 2023      Soham Wood  [address] [place] [address]        Dear ,  We are writing to inform you of your test results.    Chito Rousseau:  This is an addendum to your original lab letter.  The A1C is in the normal range and does not indicate any diabetes or pre-diabetes.  Continue to keep an eye on the fasting blood sugar over time.    Resulted Orders   CBC with platelets   Result Value Ref Range    WBC Count 6.0 4.0 - 11.0 10e3/uL    RBC Count 5.25 4.40 - 5.90 10e6/uL    Hemoglobin 17.3 13.3 - 17.7 g/dL    Hematocrit 49.6 40.0 - 53.0 %    MCV 95 78 - 100 fL    MCH 33.0 26.5 - 33.0 pg    MCHC 34.9 31.5 - 36.5 g/dL    RDW 13.1 10.0 - 15.0 %    Platelet Count 225 150 - 450 10e3/uL   Comprehensive metabolic panel (BMP + Alb, Alk Phos, ALT, AST, Total. Bili, TP)   Result Value Ref Range    Sodium 135 135 - 145 mmol/L      Comment:      Reference intervals for this test were updated on 09/26/2023 to more accurately reflect our healthy population. There may be differences in the flagging of prior results with similar values performed with this method. Interpretation of those prior results can be made in the context of the updated reference intervals.     Potassium 4.3 3.4 - 5.3 mmol/L    Carbon Dioxide (CO2) 22 22 - 29 mmol/L    Anion Gap 11 7 - 15 mmol/L    Urea Nitrogen 19.7 8.0 - 23.0 mg/dL    Creatinine 0.89 0.67 - 1.17 mg/dL    GFR Estimate >90 >60 mL/min/1.73m2    Calcium 9.8 8.8 - 10.2 mg/dL    Chloride 102 98 - 107 mmol/L    Glucose 118 (H) 70 - 99 mg/dL    Alkaline Phosphatase 66 40 - 150 U/L      Comment:      Reference intervals for this test were updated on 11/14/2023 to more accurately reflect our healthy population. There may be differences in the flagging of prior results with similar values performed with this method. Interpretation of those prior results can be made in the context of the updated reference intervals.    AST 35 0 - 45 U/L      Comment:      Reference intervals for this test  were updated on 6/12/2023 to more accurately reflect our healthy population. There may be differences in the flagging of prior results with similar values performed with this method. Interpretation of those prior results can be made in the context of the updated reference intervals.    ALT 54 0 - 70 U/L      Comment:      Reference intervals for this test were updated on 6/12/2023 to more accurately reflect our healthy population. There may be differences in the flagging of prior results with similar values performed with this method. Interpretation of those prior results can be made in the context of the updated reference intervals.      Protein Total 7.6 6.4 - 8.3 g/dL    Albumin 4.4 3.5 - 5.2 g/dL    Bilirubin Total 0.6 <=1.2 mg/dL   Lipid panel reflex to direct LDL Fasting   Result Value Ref Range    Cholesterol 258 (H) <200 mg/dL    Triglycerides 277 (H) <150 mg/dL    Direct Measure HDL 36 (L) >=40 mg/dL    LDL Cholesterol Calculated 167 (H) <=100 mg/dL    Non HDL Cholesterol 222 (H) <130 mg/dL    Patient Fasting > 8hrs? Yes     Narrative    Cholesterol  Desirable:  <200 mg/dL    Triglycerides  Normal:  Less than 150 mg/dL  Borderline High:  150-199 mg/dL  High:  200-499 mg/dL  Very High:  Greater than or equal to 500 mg/dL    Direct Measure HDL  Female:  Greater than or equal to 50 mg/dL   Male:  Greater than or equal to 40 mg/dL    LDL Cholesterol  Desirable:  <100mg/dL  Above Desirable:  100-129 mg/dL   Borderline High:  130-159 mg/dL   High:  160-189 mg/dL   Very High:  >= 190 mg/dL    Non HDL Cholesterol  Desirable:  130 mg/dL  Above Desirable:  130-159 mg/dL  Borderline High:  160-189 mg/dL  High:  190-219 mg/dL  Very High:  Greater than or equal to 220 mg/dL   Hepatitis C Screen Reflex to HCV RNA Quant and Genotype   Result Value Ref Range    Hepatitis C Antibody Nonreactive Nonreactive      Comment:      A nonreactive screening test result does not exclude the possibility of exposure to or infection  with HCV. Nonreactive screening test results in individuals with prior exposure to HCV may be due to antibody levels below the limit of detection of this assay or lack of reactivity to the HCV antigens used in this assay. Patients with recent HCV infections (<3 months from time of exposure) may have false-negative HCV antibody results due to the time needed for seroconversion (average of 8 to 9 weeks).   Hepatitis B surface antigen   Result Value Ref Range    Hepatitis B Surface Antigen Nonreactive Nonreactive   Hemoglobin A1c   Result Value Ref Range    Hemoglobin A1C 5.3 0.0 - 5.6 %      Comment:      Normal <5.7%   Prediabetes 5.7-6.4%    Diabetes 6.5% or higher     Note: Adopted from ADA consensus guidelines.       If you have any questions or concerns, please call the clinic at the number listed above.       Sincerely,      Brett Groves MD

## 2023-12-27 NOTE — PROGRESS NOTES
"SUBJECTIVE:   Soham is a 68 year old, presenting for the following:  Wellness Visit (Pt is fasting today, no concerns today. )        12/27/2023     8:51 AM   Additional Questions   Roomed by Vanessa Andrade CMA       Are you in the first 12 months of your Medicare coverage?  No    Healthy Habits:     In general, how would you rate your overall health?  Fair    Frequency of exercise:  2-3 days/week    Duration of exercise:  15-30 minutes    Do you usually eat at least 4 servings of fruit and vegetables a day, include whole grains    & fiber and avoid regularly eating high fat or \"junk\" foods?  Yes    Taking medications regularly:  Yes    Barriers to taking medications:  None    Medication side effects:  None    Ability to successfully perform activities of daily living:  No assistance needed    Home Safety:  No safety concerns identified    Hearing Impairment:  No hearing concerns    In the past 6 months, have you been bothered by leaking of urine?  No    In general, how would you rate your overall mental or emotional health?  Good    Additional concerns today:  No      Today's PHQ-2 Score:       12/26/2023     4:06 PM   PHQ-2 ( 1999 [employer])   Q1: Little interest or pleasure in doing things 0   Q2: Feeling down, depressed or hopeless 0   PHQ-2 Score 0   Q1: Little interest or pleasure in doing things Not at all   Q2: Feeling down, depressed or hopeless Not at all   PHQ-2 Score 0           Have you ever done Advance Care Planning? (For example, a Health Directive, POLST, or a discussion with a medical provider or your loved ones about your wishes): No, advance care planning information given to patient to review.  Patient plans to discuss their wishes with loved ones or provider.         Fall risk  Fallen 2 or more times in the past year?: No  Any fall with injury in the past year?: No    Cognitive Screening   1) Repeat 3 items (Leader, Season, Table)    2) Clock draw: NORMAL  3) 3 item recall: Recalls 2 objects "   Results: NORMAL clock, 1-2 items recalled: COGNITIVE IMPAIRMENT LESS LIKELY    Mini-CogTM Copyright NICOLE Mata. Licensed by the author for use in [facility] [place]; reprinted with permission (sae@[facility]). All rights reserved.      Do you have sleep apnea, excessive snoring or daytime drowsiness? : no    Reviewed and updated as needed this visit by clinical staff   Tobacco  Allergies  Meds              Reviewed and updated as needed this visit by Provider                 Social History     Tobacco Use    Smoking status: Former     Packs/day: .75     Types: Cigarettes    Smokeless tobacco: Never   Substance Use Topics    Alcohol use: Yes     Comment: Alcoholic Drinks/day: 4 drinks per wk           12/26/2023     4:06 PM   Alcohol Use   Prescreen: >3 drinks/day or >7 drinks/week? No          No data to display              Do you have a current opioid prescription? No  Do you use any other controlled substances or medications that are not prescribed by a provider? None              Current providers sharing in care for this patient include:   Patient Care Team:  Brett Groves MD as PCP - General (Family Practice)  Brett Groves MD as Assigned PCP  Edenilson Davis MD as Assigned Surgical Provider    The following health maintenance items are reviewed in Epic and correct as of today:  Health Maintenance   Topic Date Due    HEPATITIS C SCREENING  Never done    ZOSTER IMMUNIZATION (1 of 2) Never done    RSV VACCINE (Pregnancy & 60+) (1 - 1-dose 60+ series) Never done    MEDICARE ANNUAL WELLNESS VISIT  12/22/2023    LUNG CANCER SCREENING  01/05/2024    ANNUAL REVIEW OF HM ORDERS  12/27/2024    FALL RISK ASSESSMENT  12/27/2024    DTAP/TDAP/TD IMMUNIZATION (3 - Td or Tdap) 09/12/2026    COLORECTAL CANCER SCREENING  03/16/2027    LIPID  12/15/2027    ADVANCE CARE PLANNING  12/27/2028    PHQ-2 (once per calendar year)  Completed    INFLUENZA VACCINE  Completed    Pneumococcal Vaccine: 65+ Years   "Completed    AORTIC ANEURYSM SCREENING (SYSTEM ASSIGNED)  Completed    COVID-19 Vaccine  Completed    IPV IMMUNIZATION  Aged Out    HPV IMMUNIZATION  Aged Out    MENINGITIS IMMUNIZATION  Aged Out    RSV MONOCLONAL ANTIBODY  Aged Out               Review of Systems   Constitutional:  Negative for chills and fever.   HENT:  Negative for congestion, ear pain, hearing loss and sore throat.    Eyes:  Negative for pain and visual disturbance.   Respiratory:  Negative for cough and shortness of breath.    Cardiovascular:  Negative for chest pain, palpitations and peripheral edema.   Gastrointestinal:  Negative for abdominal pain, constipation, diarrhea, heartburn, hematochezia and nausea.   Genitourinary:  Negative for dysuria, frequency, genital sores, hematuria, impotence, penile discharge and urgency.   Musculoskeletal:  Negative for arthralgias, joint swelling and myalgias.   Skin:  Negative for rash.   Neurological:  Negative for dizziness, weakness, headaches and paresthesias.   Psychiatric/Behavioral:  Negative for mood changes. The patient is not nervous/anxious.      Current Outpatient Medications   Medication    aspirin 81 MG EC tablet    cholecalciferol, vitamin D3, (VITAMIN D3 ORAL)    DOCOSAHEXANOIC ACID/EPA (FISH OIL ORAL)    hydrochlorothiazide (MICROZIDE) 12.5 MG capsule    lisinopril (ZESTRIL) 20 MG tablet     No current facility-administered medications for this visit.         OBJECTIVE:   BP (!) 132/95   Pulse 62   Resp 18   Ht 1.778 m (5' 10\")   Wt 93.9 kg (207 lb)   SpO2 96%   BMI 29.70 kg/m   Estimated body mass index is 29.7 kg/m  as calculated from the following:    Height as of this encounter: 1.778 m (5' 10\").    Weight as of this encounter: 93.9 kg (207 lb).    Wt Readings from Last 4 Encounters:   12/27/23 93.9 kg (207 lb)   09/13/23 93.4 kg (206 lb)   12/22/21 83.9 kg (185 lb)   12/14/21 83.4 kg (183 lb 14.4 oz)      Physical Exam  GENERAL: healthy, alert and no distress  EYES: Eyes " grossly normal to inspection, PERRL and conjunctivae and sclerae normal  HENT: ear canals and TM's normal, nose and mouth without ulcers or lesions  NECK: no adenopathy, no asymmetry, masses, or scars and thyroid normal to palpation  RESP: lungs clear to auscultation - no rales, rhonchi or wheezes  CV: regular rate and rhythm, normal S1 S2, no S3 or S4, no murmur, click or rub, no peripheral edema and peripheral pulses strong  ABDOMEN: soft, nontender, no hepatosplenomegaly, no masses and bowel sounds normal  RECTAL:   not examined.  Followed closely by Urology  MS: no gross musculoskeletal defects noted, no edema  SKIN: no suspicious lesions or rashes  NEURO: Normal strength and tone, mentation intact and speech normal  PSYCH: mentation appears normal, affect normal/bright        ASSESSMENT / PLAN:       ICD-10-CM    1. Encounter for Medicare annual wellness exam  Z00.00 REVIEW OF HEALTH MAINTENANCE PROTOCOL ORDERS     CBC with platelets     Comprehensive metabolic panel (BMP + Alb, Alk Phos, ALT, AST, Total. Bili, TP)     Lipid panel reflex to direct LDL Fasting     CBC with platelets     Comprehensive metabolic panel (BMP + Alb, Alk Phos, ALT, AST, Total. Bili, TP)     Lipid panel reflex to direct LDL Fasting      2. Benign essential hypertension, needs home BP checks as elevated in clinic I10       3. Infrarenal abdominal aortic aneurysm (AAA) without rupture (H24)  I71.43 US Abdominal Aorta Imaging      4. Prostate cancer (H), follow with Urology C61       5. Benign neoplasm of colon, unspecified part of colon, colonoscopy March 2027 D12.6       6. Former cigarette smoker, low dose Chest CT Z87.891 CT Chest Lung Cancer Scrn Low Dose wo      7. Diffuse nodular cirrhosis of liver (H), ultrasound liver and screen for HEP B and C K74.60 US Abdomen Limited     Hepatitis C Screen Reflex to HCV RNA Quant and Genotype     Hepatitis B surface antigen     Hepatitis C Screen Reflex to HCV RNA Quant and Genotype      "Hepatitis B surface antigen      8. Encounter for screening for other viral diseases  Z11.59 Hepatitis B surface antigen     Hepatitis B surface antigen          PLAN:  Colonoscopy due March 2027    Fasting labs (PSAs are followed through Urology)    Continue close Urology follow up    Follow up ultrasound to reassess the abdominal aortic aneurysm    Annual Low dose Chest CT     Consider the Shingrix vaccine at a local pharmacy.    RSV discussed, wait a year.    Consider seeing Dr Darrius Flores ([place] and [address])    Ultrasound of the liver with screening for Hep B and C due to reading of \"cirrhosis\" on last low dose Chest CT.    Recommend an [facility] BP MONITOR.  Make a Nurse BP   Check appt and bring in your monitor.       Patient has been advised of split billing requirements and indicates understanding: Yes      COUNSELING:  Reviewed preventive health counseling, as reflected in patient instructions       Regular exercise       Healthy diet/nutrition       Colon cancer screening       Prostate cancer screening      BMI:   Estimated body mass index is 29.7 kg/m  as calculated from the following:    Height as of this encounter: 1.778 m (5' 10\").    Weight as of this encounter: 93.9 kg (207 lb).         He reports that he has quit smoking. His smoking use included cigarettes. He smoked an average of 0.8 packs per day. He has never used smokeless tobacco.      Appropriate preventive services were discussed with this patient, including applicable screening as appropriate for fall prevention, nutrition, physical activity, Tobacco-use cessation, weight loss and cognition.  Checklist reviewing preventive services available has been given to the patient.    Reviewed patients plan of care and provided an AVS. The Basic Care Plan (routine screening as documented in Health Maintenance) for Gualberto meets the Care Plan requirement. This Care Plan has been established and reviewed with the Patient.        Brett Groves MD  M " [facility]    Identified Health Risks:  The patient was provided with suggestions to help him develop a healthy physical lifestyle.

## 2023-12-27 NOTE — PATIENT INSTRUCTIONS
"Colonoscopy due March 2027    Fasting labs (PSAs are followed through Urology)    Continue close Urology follow up    Follow up ultrasound to reassess the abdominal aortic aneurysm    Annual Low dose Chest CT     Consider the Shingrix vaccine at a local pharmacy.    RSV discussed, wait a year.    Consider seeing Dr Darrius Flores ([place] [address])    Ultrasound of the liver with screening for Hep B and C due to reading of \"cirrhosis\" on last low dose Chest CT.    Recommend an [facility] BP MONITOR.  Make a Nurse BP   Check appt and bring in your monitor.       Patient Education   Personalized Prevention Plan  You are due for the preventive services outlined below.  Your care team is available to assist you in scheduling these services.  If you have already completed any of these items, please share that information with your care team to update in your medical record.  Health Maintenance Due   Topic Date Due    ANNUAL REVIEW OF HM ORDERS  Never done    Hepatitis C Screening  Never done    Zoster (Shingles) Vaccine (1 of 2) Never done    RSV VACCINE (Pregnancy & 60+) (1 - 1-dose 60+ series) Never done    Annual Wellness Visit  12/22/2023    LUNG CANCER SCREENING  01/05/2024     Your Health Risk Assessment indicates you feel you are not in good health    A healthy lifestyle helps keep the body fit and the mind alert. It helps protect you from disease, helps you fight disease, and helps prevent chronic disease (disease that doesn't go away) from getting worse. This is important as you get older and begin to notice twinges in muscles and joints and a decline in the strength and stamina you once took for granted. A healthy lifestyle includes good healthcare, good nutrition, weight control, recreation, and regular exercise. Avoid harmful substances and do what you can to keep safe. Another part of a healthy lifestyle is stay mentally active and socially involved.    Good healthcare   Have a wellness visit every year.   If " you have new symptoms, let us know right away. Don't wait until the next checkup.   Take medicines exactly as prescribed and keep your medicines in a safe place. Tell us if your medicine causes problems.   Healthy diet and weight control   Eat 3 or 4 small, nutritious, low-fat, high-fiber meals a day. Include a variety of fruits, vegetables, and whole-grain foods.   Make sure you get enough calcium in your diet. Calcium, vitamin D, and exercise help prevent osteoporosis (bone thinning).   If you live alone, try eating with others when you can. That way you get a good meal and have company while you eat it.   Try to keep a healthy weight. If you eat more calories than your body uses for energy, it will be stored as fat and you will gain weight.     Recreation   Recreation is not limited to sports and team events. It includes any activity that provides relaxation, interest, enjoyment, and exercise. Recreation provides an outlet for physical, mental, and social energy. It can give a sense of worth and achievement. It can help you stay healthy.    Mental Exercise and Social Involvement  Mental and emotional health is as important as physical health. Keep in touch with friends and family. Stay as active as possible. Continue to learn and challenge yourself.   Things you can do to stay mentally active are:  Learn something new, like a foreign language or musical instrument.   Play SCRABBLE or do crossword puzzles. If you cannot find people to play these games with you at home, you can play them with others on your computer through the Internet.   Join a games club--anything from card games to chess or checkers or lawn bowling.   Start a new hobby.   Go back to school.   Volunteer.   Read.   Keep up with world events.

## 2023-12-27 NOTE — LETTER
December 28, 2023      Soham Wood  [address] [place] [address]        Dear ,  We are writing to inform you of your test results.    Chito Rousseau:  Your screening for Hepatitis B and C is NEGATIVE.  Please complete the abdominal ultrasound to look a bit closer at the liver.  Your blood sugar is in the pre-diabetic range so I will add an A1C to better assess your glucose control in the last 3 months.  Your cholesterol and LDL are moderately elevated.  Work on a diet low in saturated fat.  You could recheck the lipids in 3-6 months and if there is no improvement then consider a statin medication.  The remaining labs are normal.    Resulted Orders   CBC with platelets   Result Value Ref Range    WBC Count 6.0 4.0 - 11.0 10e3/uL    RBC Count 5.25 4.40 - 5.90 10e6/uL    Hemoglobin 17.3 13.3 - 17.7 g/dL    Hematocrit 49.6 40.0 - 53.0 %    MCV 95 78 - 100 fL    MCH 33.0 26.5 - 33.0 pg    MCHC 34.9 31.5 - 36.5 g/dL    RDW 13.1 10.0 - 15.0 %    Platelet Count 225 150 - 450 10e3/uL   Comprehensive metabolic panel (BMP + Alb, Alk Phos, ALT, AST, Total. Bili, TP)   Result Value Ref Range    Sodium 135 135 - 145 mmol/L      Comment:      Reference intervals for this test were updated on 09/26/2023 to more accurately reflect our healthy population. There may be differences in the flagging of prior results with similar values performed with this method. Interpretation of those prior results can be made in the context of the updated reference intervals.     Potassium 4.3 3.4 - 5.3 mmol/L    Carbon Dioxide (CO2) 22 22 - 29 mmol/L    Anion Gap 11 7 - 15 mmol/L    Urea Nitrogen 19.7 8.0 - 23.0 mg/dL    Creatinine 0.89 0.67 - 1.17 mg/dL    GFR Estimate >90 >60 mL/min/1.73m2    Calcium 9.8 8.8 - 10.2 mg/dL    Chloride 102 98 - 107 mmol/L    Glucose 118 (H) 70 - 99 mg/dL    Alkaline Phosphatase 66 40 - 150 U/L      Comment:      Reference intervals for this test were updated on 11/14/2023 to more accurately reflect our healthy  population. There may be differences in the flagging of prior results with similar values performed with this method. Interpretation of those prior results can be made in the context of the updated reference intervals.    AST 35 0 - 45 U/L      Comment:      Reference intervals for this test were updated on 6/12/2023 to more accurately reflect our healthy population. There may be differences in the flagging of prior results with similar values performed with this method. Interpretation of those prior results can be made in the context of the updated reference intervals.    ALT 54 0 - 70 U/L      Comment:      Reference intervals for this test were updated on 6/12/2023 to more accurately reflect our healthy population. There may be differences in the flagging of prior results with similar values performed with this method. Interpretation of those prior results can be made in the context of the updated reference intervals.      Protein Total 7.6 6.4 - 8.3 g/dL    Albumin 4.4 3.5 - 5.2 g/dL    Bilirubin Total 0.6 <=1.2 mg/dL   Lipid panel reflex to direct LDL Fasting   Result Value Ref Range    Cholesterol 258 (H) <200 mg/dL    Triglycerides 277 (H) <150 mg/dL    Direct Measure HDL 36 (L) >=40 mg/dL    LDL Cholesterol Calculated 167 (H) <=100 mg/dL    Non HDL Cholesterol 222 (H) <130 mg/dL    Patient Fasting > 8hrs? Yes     Narrative    Cholesterol  Desirable:  <200 mg/dL    Triglycerides  Normal:  Less than 150 mg/dL  Borderline High:  150-199 mg/dL  High:  200-499 mg/dL  Very High:  Greater than or equal to 500 mg/dL    Direct Measure HDL  Female:  Greater than or equal to 50 mg/dL   Male:  Greater than or equal to 40 mg/dL    LDL Cholesterol  Desirable:  <100mg/dL  Above Desirable:  100-129 mg/dL   Borderline High:  130-159 mg/dL   High:  160-189 mg/dL   Very High:  >= 190 mg/dL    Non HDL Cholesterol  Desirable:  130 mg/dL  Above Desirable:  130-159 mg/dL  Borderline High:  160-189 mg/dL  High:  190-219 mg/dL  Very  High:  Greater than or equal to 220 mg/dL   Hepatitis C Screen Reflex to HCV RNA Quant and Genotype   Result Value Ref Range    Hepatitis C Antibody Nonreactive Nonreactive      Comment:      A nonreactive screening test result does not exclude the possibility of exposure to or infection with HCV. Nonreactive screening test results in individuals with prior exposure to HCV may be due to antibody levels below the limit of detection of this assay or lack of reactivity to the HCV antigens used in this assay. Patients with recent HCV infections (<3 months from time of exposure) may have false-negative HCV antibody results due to the time needed for seroconversion (average of 8 to 9 weeks).   Hepatitis B surface antigen   Result Value Ref Range    Hepatitis B Surface Antigen Nonreactive Nonreactive       If you have any questions or concerns, please call the clinic at the number listed above.       Sincerely,      Brett Groves MD

## 2023-12-28 LAB — HBA1C MFR BLD: 5.3 % (ref 0–5.6)

## 2024-01-02 ENCOUNTER — TELEPHONE (OUTPATIENT)
Dept: FAMILY MEDICINE | Facility: CLINIC | Age: 69
End: 2024-01-02
Payer: COMMERCIAL

## 2024-01-02 NOTE — TELEPHONE ENCOUNTER
Left message to call back for: Results  Information to relay to patient: LMTCB, please see message below.        Chito Rousseau:  Your screening for Hepatitis B and C is NEGATIVE.  Please complete the abdominal ultrasound to look a bit closer at the liver.  Your blood sugar is in the pre-diabetic range so I will add an A1C to better assess your glucose control in the last 3 months.  Your cholesterol and LDL are moderately elevated.  Work on a diet low in saturated fat.  You could recheck the lipids in 3-6 months and if there is no improvement then consider a statin medication.  The remaining labs are normal.  Dr Yaneli Rousseau:  This is an addendum to your original lab letter.  The A1C is in the normal range and does not indicate any diabetes or pre-diabetes.  Continue to keep an eye on the fasting blood sugar over time.  Dr Groves

## 2024-01-03 ENCOUNTER — HOSPITAL ENCOUNTER (OUTPATIENT)
Dept: CT IMAGING | Facility: HOSPITAL | Age: 69
Discharge: HOME | End: 2024-01-03
Attending: FAMILY MEDICINE
Payer: COMMERCIAL

## 2024-01-03 ENCOUNTER — HOSPITAL ENCOUNTER (OUTPATIENT)
Dept: ULTRASOUND IMAGING | Facility: HOSPITAL | Age: 69
Discharge: HOME | End: 2024-01-03
Attending: FAMILY MEDICINE
Payer: COMMERCIAL

## 2024-01-03 DIAGNOSIS — K74.60 DIFFUSE NODULAR CIRRHOSIS OF LIVER (H): ICD-10-CM

## 2024-01-03 DIAGNOSIS — Z87.891 FORMER CIGARETTE SMOKER: ICD-10-CM

## 2024-01-03 PROCEDURE — 76700 US EXAM ABDOM COMPLETE: CPT

## 2024-01-03 PROCEDURE — 71271 CT THORAX LUNG CANCER SCR C-: CPT

## 2024-01-04 DIAGNOSIS — I71.43 INFRARENAL ABDOMINAL AORTIC ANEURYSM (AAA) WITHOUT RUPTURE (H): Primary | ICD-10-CM

## 2024-01-04 DIAGNOSIS — I72.3 BILATERAL ILIAC ARTERY ANEURYSM (H): ICD-10-CM

## 2024-01-06 DIAGNOSIS — I10 BENIGN ESSENTIAL HYPERTENSION: ICD-10-CM

## 2024-01-08 RX ORDER — HYDROCHLOROTHIAZIDE 12.5 MG/1
CAPSULE ORAL
Qty: 90 CAPSULE | Refills: 1 | Status: SHIPPED | OUTPATIENT
Start: 2024-01-08 | End: 2024-07-08

## 2024-01-09 ENCOUNTER — TELEPHONE (OUTPATIENT)
Dept: FAMILY MEDICINE | Facility: CLINIC | Age: 69
End: 2024-01-09
Payer: COMMERCIAL

## 2024-01-09 NOTE — TELEPHONE ENCOUNTER
Patient called back and I read him off the message from Dr. Groves regarding his labs. He had no further questions and wanted to let you know he did already have his ultrasound.

## 2024-01-09 NOTE — TELEPHONE ENCOUNTER
Left message to call back for: Results  Information to relay to patient: LMTCB, please see message below.      Hi Soham:  Your Chest CT was NEGATIVE from a lung cancer screening standpoint.  There was a stable 4 mm nodule at the left lung base.  A repeat low dose scan is recommended in one year.  Dr Groves

## 2024-01-15 NOTE — TELEPHONE ENCOUNTER
LM - note that he has an estab. Care appt scheduled on Wednesday with Dr. Flores. Will make notation to inform patient of CT message.

## 2024-01-25 ENCOUNTER — TRANSFERRED RECORDS (OUTPATIENT)
Dept: HEALTH INFORMATION MANAGEMENT | Facility: CLINIC | Age: 69
End: 2024-01-25
Payer: COMMERCIAL

## 2024-03-15 ENCOUNTER — OFFICE VISIT (OUTPATIENT)
Dept: FAMILY MEDICINE | Facility: CLINIC | Age: 69
End: 2024-03-15
Payer: COMMERCIAL

## 2024-03-15 VITALS
SYSTOLIC BLOOD PRESSURE: 130 MMHG | WEIGHT: 210.9 LBS | OXYGEN SATURATION: 95 % | RESPIRATION RATE: 20 BRPM | HEIGHT: 70 IN | TEMPERATURE: 97.9 F | HEART RATE: 65 BPM | DIASTOLIC BLOOD PRESSURE: 80 MMHG | BODY MASS INDEX: 30.19 KG/M2

## 2024-03-15 DIAGNOSIS — K74.60 DIFFUSE NODULAR CIRRHOSIS OF LIVER (H): ICD-10-CM

## 2024-03-15 DIAGNOSIS — I71.43 INFRARENAL ABDOMINAL AORTIC ANEURYSM (AAA) WITHOUT RUPTURE (H): ICD-10-CM

## 2024-03-15 DIAGNOSIS — K76.0 FATTY LIVER: ICD-10-CM

## 2024-03-15 DIAGNOSIS — E66.09 CLASS 1 OBESITY DUE TO EXCESS CALORIES WITHOUT SERIOUS COMORBIDITY WITH BODY MASS INDEX (BMI) OF 30.0 TO 30.9 IN ADULT: ICD-10-CM

## 2024-03-15 DIAGNOSIS — E66.811 CLASS 1 OBESITY DUE TO EXCESS CALORIES WITHOUT SERIOUS COMORBIDITY WITH BODY MASS INDEX (BMI) OF 30.0 TO 30.9 IN ADULT: ICD-10-CM

## 2024-03-15 DIAGNOSIS — E78.00 HYPERCHOLESTEREMIA: Primary | ICD-10-CM

## 2024-03-15 DIAGNOSIS — C61 PROSTATE CANCER (H): ICD-10-CM

## 2024-03-15 DIAGNOSIS — Z87.891 PERSONAL HISTORY OF TOBACCO USE, PRESENTING HAZARDS TO HEALTH: ICD-10-CM

## 2024-03-15 DIAGNOSIS — I10 BENIGN ESSENTIAL HYPERTENSION: ICD-10-CM

## 2024-03-15 PROBLEM — L98.9 SKIN LESION: Status: RESOLVED | Noted: 2021-12-14 | Resolved: 2024-03-15

## 2024-03-15 PROCEDURE — 99214 OFFICE O/P EST MOD 30 MIN: CPT | Performed by: FAMILY MEDICINE

## 2024-03-15 RX ORDER — RESPIRATORY SYNCYTIAL VIRUS VACCINE 120MCG/0.5
0.5 KIT INTRAMUSCULAR ONCE
Qty: 1 EACH | Refills: 0 | Status: CANCELLED | OUTPATIENT
Start: 2024-03-15 | End: 2024-03-15

## 2024-03-15 RX ORDER — ATORVASTATIN CALCIUM 20 MG/1
20 TABLET, FILM COATED ORAL DAILY
Qty: 90 TABLET | Refills: 1 | Status: SHIPPED | OUTPATIENT
Start: 2024-03-15 | End: 2024-08-29

## 2024-03-15 NOTE — PROGRESS NOTES
Assessment & Plan     (E78.00) Hypercholesteremia  (primary encounter diagnosis)  Comment: discussed framingham risk score and statins. Discussed risks/benefits/side effects with the patient. He will start on a statin daily and recheck lipids in two months. All questions answered. The patient indicates understanding of these issues and agrees with the plan.   Plan: atorvastatin (LIPITOR) 20 MG tablet            (Z87.891) Personal history of tobacco use, presenting hazards to health  Comment: congratulated him on quitting.   Plan:     (C61) Prostate cancer (H)  Comment: reviewed history with him. Still sees onc with q6 month psa   Plan:     (I71.43) Infrarenal abdominal aortic aneurysm (AAA) without rupture (H24)  Comment: has an upcoming visit with vascular   Plan:     (E66.09,  Z68.30) Class 1 obesity due to excess calories without serious comorbidity with body mass index (BMI) of 30.0 to 30.9 in adult  Comment: discussed daily exercise   Plan:     (I10) Benign essential hypertension  Comment: on meds. Stable   Plan:       (K76.0) Fatty liver  Comment: fatty liver on ultrasound. Normal lfts   Plan:       Regular exercise      Caron Rousseau is a 68 year old, presenting for the following health issues:  Establish Care        3/15/2024     8:45 AM   Additional Questions   Roomed by Yu LANCASTER   Accompanied by self     History of Present Illness       Reason for visit:  Establish as patientHe consumes 1 sweetened beverage(s) daily. He exercises with enough effort to increase his heart rate 3 or less days per week.   He is taking medications regularly.     Infrarenal AAA - vascular appointment on 4/25/24     Prostate cancer - diagnosed 2-3 years ago - slow growing small ca  Monitored yearly   Biopsy x 2  Urology needed again in a coup;lower extremity years   [facility] :  [facility] for Malignant tumor of prostate  jimena 3+3 low volume prostate cancer on AS. PSA every 6 months with annual exam.  Referring  "Physician: Fabiano Gandhi, Urology, [phone]  Encounter Date: 05/10/2023      Quit oct 1 2023   Was smoking 1/2-3/4 pack  Smoked for 40+ years     Not on statin.   The 10-year ASCVD risk score (Nayely VILLAGRAN, et al., 2019) is: 25%    Values used to calculate the score:      Age: 68 years      Sex: Male      Is Non- : No      Diabetic: No      Tobacco smoker: No      Systolic Blood Pressure: 130 mmHg      Is BP treated: Yes      HDL Cholesterol: 36 mg/dL      Total Cholesterol: 258 mg/dL        Review of Systems  Constitutional, neuro, ENT, endocrine, pulmonary, cardiac, gastrointestinal, genitourinary, musculoskeletal, integument and psychiatric systems are negative, except as otherwise noted.      Objective    /80   Pulse 65   Temp 97.9  F (36.6  C) (Tympanic)   Resp 20   Ht 1.778 m (5' 10\")   Wt 95.7 kg (210 lb 14.4 oz)   SpO2 95%   BMI 30.26 kg/m    Body mass index is 30.26 kg/m .  Physical Exam   GENERAL: alert and no distress  EYES: Eyes grossly normal to inspection, PERRL and conjunctivae and sclerae normal  HENT: ear canals and TM's normal, nose and mouth without ulcers or lesions  NECK: no adenopathy, no asymmetry, masses, or scars  RESP: lungs clear to auscultation - no rales, rhonchi or wheezes  CV: regular rate and rhythm, normal S1 S2, no S3 or S4, no murmur, click or rub, no peripheral edema  ABDOMEN: soft, nontender, no hepatosplenomegaly, no masses and bowel sounds normal  MS: no gross musculoskeletal defects noted, no edema  SKIN: no suspicious lesions or rashes  NEURO: Normal strength and tone, mentation intact and speech normal  PSYCH: mentation appears normal, affect normal/bright          Signed Electronically by: Delphine Wolf MD    "

## 2024-04-15 NOTE — PROGRESS NOTES
Gualberto ORTA Israel has an upcoming lab appointment:    Future Appointments   Date Time Provider Department Center   4/25/2024 10:00 AM Donnell Silveira MD [facility]   5/15/2024  8:30 AM [facility]     Patient is scheduled for labs and there is no order available. Please review and place either future orders or HMPO (Review of Health Maintenance Protocol Orders), as appropriate.    There are no preventive care reminders to display for this patient.    Thank you  Gayle Rubio

## 2024-04-25 ENCOUNTER — OFFICE VISIT (OUTPATIENT)
Dept: VASCULAR SURGERY | Facility: CLINIC | Age: 69
End: 2024-04-25
Attending: FAMILY MEDICINE
Payer: COMMERCIAL

## 2024-04-25 VITALS
HEART RATE: 56 BPM | SYSTOLIC BLOOD PRESSURE: 137 MMHG | RESPIRATION RATE: 16 BRPM | OXYGEN SATURATION: 95 % | TEMPERATURE: 98.1 F | DIASTOLIC BLOOD PRESSURE: 78 MMHG

## 2024-04-25 DIAGNOSIS — I72.3 BILATERAL ILIAC ARTERY ANEURYSM (H): ICD-10-CM

## 2024-04-25 DIAGNOSIS — I71.43 INFRARENAL ABDOMINAL AORTIC ANEURYSM (AAA) WITHOUT RUPTURE (H): Primary | ICD-10-CM

## 2024-04-25 PROCEDURE — G0463 HOSPITAL OUTPT CLINIC VISIT: HCPCS | Performed by: HOSPITALIST

## 2024-04-25 PROCEDURE — 99204 OFFICE O/P NEW MOD 45 MIN: CPT | Performed by: HOSPITALIST

## 2024-04-25 ASSESSMENT — PAIN SCALES - GENERAL: PAINLEVEL: NO PAIN (0)

## 2024-04-25 NOTE — PATIENT INSTRUCTIONS
Fernando Burciaga,    Thank you for entrusting your care with us today. After your visit today with MD Donnell Silveira this is the plan that was discussed at your appointment.    Continue to work with your primary care provider to lower your cholesterol levels.  The goal for your LDL is to be less than 70.    Follow up in 1 year with Dr. Silveira and an ultrasound of your aorta prior.  A  will reach out to you closer to that time to schedule.         I am including additional information on these things and our contact information if you have any questions or concerns.   Please do not hesitate to reach out to us if you felt we did not answer your questions or you are unsure of the treatment plan after your visit today. Our number is [phone].Thank you for trusting us with your care.         Again thank you for your time.

## 2024-04-25 NOTE — PROGRESS NOTES
VASCULAR MEDICINE CONSULT NOTE          LOCATION:  [facility]       Date of Service: 4/25/2024      Primary Care Provider: Delphine Wolf  Referring provider;  Brett Groves      Reason for the visit/chief complaint:   AAA      HPI:  Gualberto Wood is a pleasant 68 year old male who presents to our Vascular Medicine clinic for the above mentioned reason.    Mr. Wood has past medical history of tobacco smoking for at least 45 years quit last October, AAA, hypertension, dyslipidemia, history of prostate cancer and history of intestinal obstruction s/p resection 2012.     With regards to AAA, this was first seen 2 years ago on ultrasound aorta 1/6/2022.  Patient tells me this was actually initially seen on follow-up scan for prostate that led to ordering ultrasound aorta.  That scan also showed right common iliac artery aneurysm with measurements as below:    January 2022:  Distal Aorta: 3.9 cm.  Right Common Iliac Artery: 2.1 cm.  Left Common Iliac Artery: 1.7 x 1.8 cm.      Patient did not necessarily have ongoing surveillance however, he had ultrasound abdomen from this January 1/3/2024 for evaluation of fatty liver infiltrate, this showed slight growth of his distal abdominal aortic aneurysm (see measurements below).  He was subsequently referred to our clinic.      January 2024:   Infrarenal AAA 4.6 x 4.5 cm  Right YOUNG: 2.3 x 2.1 cm   Left YOUNG: 1.8 x 1.8 cm     Denies abdominal pain, back pain or intermittent claudication.      Cardiovascular risk factors:  Hypertension: Has history of hypertension.  This has been well-controlled on hydrochlorothiazide 12.5 mg and lisinopril 20 mg.  Smoking: Patient reports that he started smoking in his early 20s, successfully quit 6 months ago October 2023 after at least 45 years of smoking.  Average smoking was 0.75 PPD.  Dyslipidemia: Has had dyslipidemia for years without treatment.  He was recently started by PCP on atorvastatin 20 mg a month  ago.  LDL from last December 2023 was 167.  Family history: Denies family history of known aneurysm or sudden death.      REVIEW OF SYSTEMS:    A 12 point ROS was reviewed and is negative except what is mentioned in HPI.       Past medical history, surgical history, medications, family history, social history and allergies were reviewed. Pertinent points mentioned under HPI.        OBJECTIVE:    Vital signs:  /78   Pulse 56   Temp 98.1  F (36.7  C)   Resp 16   SpO2 95%   Wt Readings from Last 1 Encounters:   03/15/24 210 lb 14.4 oz (95.7 kg)     There is no height or weight on file to calculate BMI.    Physical exam:  General appearance: Pleasant male in no apparent distress.    HEENT: NC/AT.    Neck: Carotids +2/2 bilaterally without bruits.  No jugular venous distension.   Heart: RRR. Normal S1, S2. No murmur, rub, click, or gallop.   Chest: Clear to auscultation bilaterally.  Abdomen: Soft, nontender, nondistended. No pulsatile mass.  No bruits.   Extremities: No lower extremity swelling.  Lower extremity vascular pulse exam is completely normal with easily palpable femoral, popliteal, DP and PT 2/2 bilaterally.  Skin: Normal skin color and temperature.  No wounds.  Neurological: Alert, awake and oriented       DIAGNOSTIC STUDIES:   Labs and diagnostics reviewed including outside records. Pertinent points are mentioned under HPI and assessment and plan sections.        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 Wood abdominal aortic aneurysm.  He does have both infra renal and right common iliac artery aneurysm.  Compared to his previous scan from 2 years ago, he grew 0.7 cm in 2 years and 0.2 cm in 2 years on the right YOUNG.  Both are not  considered rapid growth.  Etiology is atherosclerosis.  He has multiple risk factors for atherosclerosis including his smoking history, hypertension, untreated dyslipidemia, age and gender.    Overall we discussed that at this stage treatment is through medical management and risk factor control together with active surveillance.  We would recommend surveillance in 1 year.  For medical management, he is appropriately on antiplatelet therapy with aspirin 81 mg that was started many years ago for what seems to be primary prophylaxis through PCP.  Encouraged to continue.  Agree with starting statin for his dyslipidemia.  We would recommend however high intensity statin.  Okay to continue with the current dose atorvastatin 20 mg and then uptitrate if well-tolerated.  He has follow-up with PCP already with repeat lipid panel.  He successfully quit smoking 6 months ago.    We discussed cutoff for surgical/endovascular repair for  infrarenal AAA at 5.5 cm and 4 common iliac arteries around 3 cm.    He had prior CT lung for lung cancer screening and these show normal thoracic aorta size.  No concern for popliteal artery aneurysm on his exam.      Recommendations:  Repeat ultrasound aorta and iliac in 1 year from when it was last checked expected January 2025 with follow-up appointment in our clinic.  Continue aspirin 81 mg.  Continue current atorvastatin 20 mg.  Would recommend high intensity statins and up titration to at least 40 mg with goal LDL less than 70.  He has follow-up already scheduled with PCP.  Blood pressure under good control.  Congratulated on smoking cessation.  Recommend first-degree relatives to be screened for abdominal aortic aneurysm (at age 60 per AHA or 55 for males and 65 for females per American Society of vascular surgery).  Discussed no major limitation in activity apart from avoiding extreme experiences with acceleration deceleration such as roller coaster rides, skydiving, contact sports and  heavy lifting that involves bearing down for prolonged time of period.      It was a pleasure meeting with Mr. Wood in our clinic today.    Donnell Silveira MD  Vascular Medicine  April 25, 2024

## 2024-04-25 NOTE — PROGRESS NOTES
[facility]        Patient is here for a consult to discuss Abdominal aortic aneurysm (AAA) and bilateral iliac aneurysms.    Pt is currently taking Aspirin and Statin.    /78   Pulse 56   Temp 98.1  F (36.7  C)   Resp 16   SpO2 95%     The provider has been notified that the patient has concerns of AAA.     Questions patient would like addressed today are: N/A.    Refills are needed: N/A    Has homecare services and agency name:  No

## 2024-05-09 ENCOUNTER — DOCUMENTATION ONLY (OUTPATIENT)
Dept: FAMILY MEDICINE | Facility: CLINIC | Age: 69
End: 2024-05-09
Payer: COMMERCIAL

## 2024-05-09 DIAGNOSIS — E78.00 HYPERCHOLESTEREMIA: Primary | ICD-10-CM

## 2024-05-09 NOTE — PROGRESS NOTES
"Gualberto YOU Wood has an upcoming lab appointment:    Future Appointments   Date Time Provider Department Center   5/15/2024  8:30 AM  [facility]     Patient stated \"Doctor requested blood work\", and there are no current orders. Please place orders as needed.     ThanksCleo"

## 2024-05-15 ENCOUNTER — LAB (OUTPATIENT)
Dept: LAB | Facility: CLINIC | Age: 69
End: 2024-05-15
Attending: FAMILY MEDICINE
Payer: COMMERCIAL

## 2024-05-15 DIAGNOSIS — E78.00 HYPERCHOLESTEREMIA: ICD-10-CM

## 2024-05-15 LAB
CHOLEST SERPL-MCNC: 141 MG/DL
FASTING STATUS PATIENT QL REPORTED: YES
HDLC SERPL-MCNC: 34 MG/DL
LDLC SERPL CALC-MCNC: 81 MG/DL
NONHDLC SERPL-MCNC: 107 MG/DL
TRIGL SERPL-MCNC: 128 MG/DL

## 2024-05-15 PROCEDURE — 36415 COLL VENOUS BLD VENIPUNCTURE: CPT

## 2024-05-15 PROCEDURE — 80061 LIPID PANEL: CPT

## 2024-06-17 ENCOUNTER — MYC MEDICAL ADVICE (OUTPATIENT)
Dept: FAMILY MEDICINE | Facility: CLINIC | Age: 69
End: 2024-06-17
Payer: COMMERCIAL

## 2024-07-07 ENCOUNTER — MYC MEDICAL ADVICE (OUTPATIENT)
Dept: FAMILY MEDICINE | Facility: CLINIC | Age: 69
End: 2024-07-07
Payer: COMMERCIAL

## 2024-07-07 DIAGNOSIS — I10 BENIGN ESSENTIAL HYPERTENSION: ICD-10-CM

## 2024-07-08 RX ORDER — HYDROCHLOROTHIAZIDE 12.5 MG/1
1 CAPSULE ORAL DAILY
Qty: 90 CAPSULE | Refills: 1 | Status: SHIPPED | OUTPATIENT
Start: 2024-07-08

## 2024-07-29 ENCOUNTER — MYC MEDICAL ADVICE (OUTPATIENT)
Dept: FAMILY MEDICINE | Facility: CLINIC | Age: 69
End: 2024-07-29
Payer: COMMERCIAL

## 2024-07-29 DIAGNOSIS — M25.561 ACUTE PAIN OF RIGHT KNEE: Primary | ICD-10-CM

## 2024-08-07 ENCOUNTER — OFFICE VISIT (OUTPATIENT)
Dept: ORTHOPEDICS | Facility: CLINIC | Age: 69
End: 2024-08-07
Attending: FAMILY MEDICINE
Payer: COMMERCIAL

## 2024-08-07 ENCOUNTER — ANCILLARY PROCEDURE (OUTPATIENT)
Dept: GENERAL RADIOLOGY | Facility: CLINIC | Age: 69
End: 2024-08-07
Attending: STUDENT IN AN ORGANIZED HEALTH CARE EDUCATION/TRAINING PROGRAM
Payer: COMMERCIAL

## 2024-08-07 VITALS — WEIGHT: 216.3 LBS | BODY MASS INDEX: 31.04 KG/M2

## 2024-08-07 DIAGNOSIS — M25.561 CHRONIC PAIN OF RIGHT KNEE: ICD-10-CM

## 2024-08-07 DIAGNOSIS — M17.11 PRIMARY OSTEOARTHRITIS OF RIGHT KNEE: Primary | ICD-10-CM

## 2024-08-07 DIAGNOSIS — G89.29 CHRONIC PAIN OF RIGHT KNEE: ICD-10-CM

## 2024-08-07 PROCEDURE — 20611 DRAIN/INJ JOINT/BURSA W/US: CPT | Mod: RT | Performed by: STUDENT IN AN ORGANIZED HEALTH CARE EDUCATION/TRAINING PROGRAM

## 2024-08-07 PROCEDURE — 99203 OFFICE O/P NEW LOW 30 MIN: CPT | Mod: 25 | Performed by: STUDENT IN AN ORGANIZED HEALTH CARE EDUCATION/TRAINING PROGRAM

## 2024-08-07 PROCEDURE — 73560 X-RAY EXAM OF KNEE 1 OR 2: CPT | Mod: TC | Performed by: RADIOLOGY

## 2024-08-07 PROCEDURE — 73562 X-RAY EXAM OF KNEE 3: CPT | Mod: TC | Performed by: RADIOLOGY

## 2024-08-07 RX ORDER — TRIAMCINOLONE ACETONIDE 40 MG/ML
40 INJECTION, SUSPENSION INTRA-ARTICULAR; INTRAMUSCULAR
Status: SHIPPED | OUTPATIENT
Start: 2024-08-07

## 2024-08-07 RX ORDER — LIDOCAINE HYDROCHLORIDE 10 MG/ML
3 INJECTION, SOLUTION INFILTRATION; PERINEURAL
Status: SHIPPED | OUTPATIENT
Start: 2024-08-07

## 2024-08-07 RX ORDER — LIDOCAINE HYDROCHLORIDE 10 MG/ML
2 INJECTION, SOLUTION INFILTRATION; PERINEURAL
Status: SHIPPED | OUTPATIENT
Start: 2024-08-07

## 2024-08-07 RX ADMIN — LIDOCAINE HYDROCHLORIDE 2 ML: 10 INJECTION, SOLUTION INFILTRATION; PERINEURAL at 11:55

## 2024-08-07 RX ADMIN — LIDOCAINE HYDROCHLORIDE 3 ML: 10 INJECTION, SOLUTION INFILTRATION; PERINEURAL at 11:55

## 2024-08-07 RX ADMIN — TRIAMCINOLONE ACETONIDE 40 MG: 40 INJECTION, SUSPENSION INTRA-ARTICULAR; INTRAMUSCULAR at 11:55

## 2024-08-07 NOTE — LETTER
8/7/2024      Gualberto Wood  [address] [place] [address]      Dear Colleague,    Thank you for referring your patient, Gualberto Wood, to the [facility]. Please see a copy of my visit note below.    ASSESSMENT & PLAN    Soham was seen today for pain.    Diagnoses and all orders for this visit:    Primary osteoarthritis of right knee  -     Orthopedic  Referral  -     XR Knee Standing AP Bilat Sandy Level Bilat Lat Right; Future  -     Physical Therapy  Referral; Future  -     Large Joint Injection/Arthocentesis: R knee joint      This issue is chronic and Worsening. Soham presents to clinic today to discuss his chronic, intermittent right knee pain.  His history, exam findings, and imaging findings are consistent with radiographically severe medial compartment osteoarthritis and his radiographically moderate patellofemoral osteoarthritis is the main  of his symptoms.  Corona these findings and the spectrum of treatment options including anti-inflammatory medicines, physical therapy, corticosteroid injections, hyaluronic acid injections, and surgical intervention in the form of a knee replacement.  We discussed that while his medial compartment osteoarthritis is severe, given his symptoms are intermittent and only last for a few days to a week, he would not be a good candidate for knee replacement at this time.  We discussed the importance of physical therapy to help strengthen the muscles around the knee and support the joint with his degenerative changes.  We discussed the utility of a corticosteroid injection for both pain relief and to allow the patient to better participate in physical therapy, and after this discussion the patient wished to proceed with a corticosteroid injection today.  We determined the following plan:  - CSI to the right knee performed today under ultrasound guidance, see procedure note below for details  - Physical therapy  referral placed  - He can continue to use over-the-counter pain medicines as needed  - He can follow-up in our clinic as needed      Large Joint Injection/Arthocentesis: R knee joint    Date/Time: 8/7/2024 11:55 AM    Performed by: Saleem Barakat DO  Authorized by: Saleem Barakat DO    Indications:  Pain and osteoarthritis  Needle Size:  25 G  Guidance: ultrasound    Approach:  Anterolateral  Location:  Knee      Medications:  40 mg triamcinolone 40 MG/ML; 3 mL lidocaine 1 %; 2 mL lidocaine 1 %  Medications comment:  2 ml 0.5% Ropivacaine NDC 23716-486-29, Lot# [id number], Expires 02/2025  Outcome:  Tolerated well, no immediate complications  Procedure discussed: discussed risks, benefits, and alternatives    Consent Given by:  Patient  Timeout: timeout called immediately prior to procedure    Prep: patient was prepped and draped in usual sterile fashion     Ultrasound was used to ensure safe and accurate needle placement and injection. Ultrasound images of the procedure were permanently stored.  1ml of 8.4% Sodium Bicarbonate solution was used to buffer the local numbing agent for today's injection        Saleem Barakat DO  [facility]    -----  Chief Complaint   Patient presents with     Right Knee - Pain       SUBJECTIVE  Gualberto Wood is a/an 69 year old male who is seen in consultation at the request of  Delphine Wolf M.D. for evaluation of sharp shooting pain in the right knee.     The patient is seen by themselves.    Onset: 9-12 month(s) ago. Reports insidious onset without acute precipitating event.  Location of Pain: medial knee  Worsened by: sitting or laying on back  Better with: standing, walking  Treatments tried: ice and Aleve, brace (helps a little)  Associated symptoms: swelling    Orthopedic/Surgical history: L knee ACL-R 1991, L knee meniscus repair 2010; No back history either  Social History/Occupation: Mostly retired; works 1 day per week as  jed      REVIEW OF SYSTEMS:  Review of systems negative unless mentioned in HPI     OBJECTIVE:  Wt 98.1 kg (216 lb 4.8 oz)   BMI 31.04 kg/m     General: healthy, alert and in no distress  Skin: no suspicious lesions or rash.  CV: distal perfusion intact   Resp: normal respiratory effort without conversational dyspnea   Psych: normal mood and affect  Gait: NORMAL  Neuro: Normal light sensory exam of RL extremity     Right Knee exam  Gait: Normal  Alignment:  [x] Normal  [] Anatomic valgus  [] Anatomic varus  Inspection: [x] Normal  [] Ecchymosis present []Other   Palpation:  Joint Tenderness: [] No Tenderness  [x] MJL [] LJL [] MCL Margin [] LCL Margin [] Pes Anserine [] Distal Quadricep  [] Other   Peripatellar Tenderness: [x] None [] Lateral pole [] Medial pole [] Superior pole [] Inferior pole    Patellar tendon pain: [x] None [] Present    Patellar apprehension: [x] None [] Present    Patellar motion: [x] Normal [] Abnormal  Crepitus: [x] None [] Present  Effusion: [x] None [] Trace [] 1+ [] 2+ [] 3+  Range of motion:  Flexion: 135, Extension: 0  Strength:  [x] Full in all planes, including intact extensor mechanism [] Limited as described  Neurologic: Normal sensation   Vascular: Normal pulses   Special tests:   Lachman: Negative  Anterior Drawer: Negative  Sag/quad Activation: NP  Posterior Drawer: Negative  Valgus Stress: Negative at 0/30  Varus Stress: Negative at 0/30  Luiz's: Negative  Thessaly: NP     Other notable findings/comments: None       RADIOLOGY:  Final results and radiologist's interpretation, available in the [facility] health record.  Images were reviewed with the patient in the office today.  My personal interpretation of the performed imaging: Severe joint space narrowing and osteophytosis of the medial compartment of the knee where there is bone-on-bone articulation.  Moderate patellofemoral joint space narrowing and osteophytosis.  No acute fractures.              Again, thank you for  allowing me to participate in the care of your patient.        Sincerely,        Saleem Barakat, DO

## 2024-08-29 DIAGNOSIS — E78.00 HYPERCHOLESTEREMIA: ICD-10-CM

## 2024-08-29 RX ORDER — ATORVASTATIN CALCIUM 20 MG/1
20 TABLET, FILM COATED ORAL DAILY
Qty: 90 TABLET | Refills: 2 | Status: SHIPPED | OUTPATIENT
Start: 2024-08-29

## 2024-09-17 ENCOUNTER — MYC MEDICAL ADVICE (OUTPATIENT)
Dept: FAMILY MEDICINE | Facility: CLINIC | Age: 69
End: 2024-09-17
Payer: COMMERCIAL

## 2024-09-17 DIAGNOSIS — I10 HYPERTENSION, UNSPECIFIED TYPE: ICD-10-CM

## 2024-09-17 RX ORDER — LISINOPRIL 20 MG/1
20 TABLET ORAL DAILY
Qty: 90 TABLET | Refills: 1 | Status: SHIPPED | OUTPATIENT
Start: 2024-09-17

## 2024-11-27 ENCOUNTER — PATIENT OUTREACH (OUTPATIENT)
Dept: CARE COORDINATION | Facility: CLINIC | Age: 69
End: 2024-11-27
Payer: COMMERCIAL

## 2025-01-03 DIAGNOSIS — I10 BENIGN ESSENTIAL HYPERTENSION: ICD-10-CM

## 2025-01-03 NOTE — TELEPHONE ENCOUNTER
Has appointment scheduled for 2/13/25.      Requested Prescriptions   Pending Prescriptions Disp Refills    hydrochlorothiazide (MICROZIDE) 12.5 MG capsule [Pharmacy Med Name: HYDROCHLOROTHIAZIDE 12.5 MG CP] 90 capsule 1     Sig: TAKE 1 CAPSULE BY MOUTH EVERY DAY       Diuretics (Including Combos) Protocol Failed - 1/3/2025  9:55 AM        Failed - Potassium level on file in past 12 months        Failed - Has GFR on file in past 12 months and most recent value is normal        Passed - Most recent blood pressure under 140/90 in past 12 months     BP Readings from Last 3 Encounters:   04/25/24 137/78   03/15/24 130/80   12/27/23 (!) 132/95       No data recorded            Passed - Medication is active on med list        Passed - Medication indicated for associated diagnosis     Medication is associated with one or more of the following diagnoses:     Edema   Hypertension   Heart Failure   Meniere's Disease   Bilateral localized swelling of lower limbs   Pulmonary Hypertension          Passed - Recent (12 mo) or future (90 days) visit within the authorizing provider's specialty     The patient must have completed an in-person or virtual visit within the past 12 months or has a future visit scheduled within the next 90 days with the authorizing provider s specialty.  Urgent care and e-visits do not qualify as an office visit for this protocol.          Passed - Patient is age 18 or older

## 2025-01-04 RX ORDER — HYDROCHLOROTHIAZIDE 12.5 MG/1
1 CAPSULE ORAL DAILY
Qty: 90 CAPSULE | Refills: 0 | Status: SHIPPED | OUTPATIENT
Start: 2025-01-04

## 2025-01-09 ENCOUNTER — TRANSFERRED RECORDS (OUTPATIENT)
Dept: HEALTH INFORMATION MANAGEMENT | Facility: CLINIC | Age: 70
End: 2025-01-09

## 2025-02-11 ENCOUNTER — TRANSFERRED RECORDS (OUTPATIENT)
Dept: HEALTH INFORMATION MANAGEMENT | Facility: CLINIC | Age: 70
End: 2025-02-11
Payer: COMMERCIAL

## 2025-02-11 SDOH — HEALTH STABILITY: PHYSICAL HEALTH: ON AVERAGE, HOW MANY DAYS PER WEEK DO YOU ENGAGE IN MODERATE TO STRENUOUS EXERCISE (LIKE A BRISK WALK)?: 3 DAYS

## 2025-02-11 SDOH — HEALTH STABILITY: PHYSICAL HEALTH: ON AVERAGE, HOW MANY MINUTES DO YOU ENGAGE IN EXERCISE AT THIS LEVEL?: 40 MIN

## 2025-02-11 ASSESSMENT — SOCIAL DETERMINANTS OF HEALTH (SDOH): HOW OFTEN DO YOU GET TOGETHER WITH FRIENDS OR RELATIVES?: ONCE A WEEK

## 2025-02-13 ENCOUNTER — OFFICE VISIT (OUTPATIENT)
Dept: FAMILY MEDICINE | Facility: CLINIC | Age: 70
End: 2025-02-13
Payer: COMMERCIAL

## 2025-02-13 VITALS
OXYGEN SATURATION: 94 % | SYSTOLIC BLOOD PRESSURE: 124 MMHG | TEMPERATURE: 97 F | HEART RATE: 68 BPM | BODY MASS INDEX: 31.25 KG/M2 | WEIGHT: 218.3 LBS | RESPIRATION RATE: 16 BRPM | DIASTOLIC BLOOD PRESSURE: 76 MMHG | HEIGHT: 70 IN

## 2025-02-13 DIAGNOSIS — I10 BENIGN ESSENTIAL HYPERTENSION: ICD-10-CM

## 2025-02-13 DIAGNOSIS — C61 PROSTATE CANCER (H): ICD-10-CM

## 2025-02-13 DIAGNOSIS — E78.00 HYPERCHOLESTEREMIA: ICD-10-CM

## 2025-02-13 DIAGNOSIS — I10 HYPERTENSION, UNSPECIFIED TYPE: ICD-10-CM

## 2025-02-13 DIAGNOSIS — Z87.891 PERSONAL HISTORY OF TOBACCO USE: ICD-10-CM

## 2025-02-13 DIAGNOSIS — Z00.00 ENCOUNTER FOR MEDICARE ANNUAL WELLNESS EXAM: Primary | ICD-10-CM

## 2025-02-13 LAB
ALBUMIN SERPL BCG-MCNC: 4.1 G/DL (ref 3.5–5.2)
ALP SERPL-CCNC: 76 U/L (ref 40–150)
ALT SERPL W P-5'-P-CCNC: 82 U/L (ref 0–70)
ANION GAP SERPL CALCULATED.3IONS-SCNC: 7 MMOL/L (ref 7–15)
AST SERPL W P-5'-P-CCNC: 38 U/L (ref 0–45)
BILIRUB SERPL-MCNC: 0.5 MG/DL
BUN SERPL-MCNC: 18.2 MG/DL (ref 8–23)
CALCIUM SERPL-MCNC: 9.4 MG/DL (ref 8.8–10.4)
CHLORIDE SERPL-SCNC: 104 MMOL/L (ref 98–107)
CHOLEST SERPL-MCNC: 131 MG/DL
CREAT SERPL-MCNC: 1.02 MG/DL (ref 0.67–1.17)
EGFRCR SERPLBLD CKD-EPI 2021: 80 ML/MIN/1.73M2
FASTING STATUS PATIENT QL REPORTED: YES
FASTING STATUS PATIENT QL REPORTED: YES
GLUCOSE SERPL-MCNC: 114 MG/DL (ref 70–99)
HCO3 SERPL-SCNC: 27 MMOL/L (ref 22–29)
HDLC SERPL-MCNC: 34 MG/DL
LDLC SERPL CALC-MCNC: 70 MG/DL
NONHDLC SERPL-MCNC: 97 MG/DL
POTASSIUM SERPL-SCNC: 4.6 MMOL/L (ref 3.4–5.3)
PROT SERPL-MCNC: 6.9 G/DL (ref 6.4–8.3)
SODIUM SERPL-SCNC: 138 MMOL/L (ref 135–145)
TRIGL SERPL-MCNC: 137 MG/DL

## 2025-02-13 RX ORDER — LISINOPRIL 20 MG/1
20 TABLET ORAL DAILY
Qty: 90 TABLET | Refills: 3 | Status: SHIPPED | OUTPATIENT
Start: 2025-02-13

## 2025-02-13 RX ORDER — ATORVASTATIN CALCIUM 20 MG/1
20 TABLET, FILM COATED ORAL DAILY
Qty: 90 TABLET | Refills: 3 | Status: SHIPPED | OUTPATIENT
Start: 2025-02-13

## 2025-02-13 RX ORDER — HYDROCHLOROTHIAZIDE 12.5 MG/1
1 CAPSULE ORAL DAILY
Qty: 90 CAPSULE | Refills: 3 | Status: SHIPPED | OUTPATIENT
Start: 2025-02-13

## 2025-02-13 NOTE — PATIENT INSTRUCTIONS
Patient Education   Preventive Care Advice   This is general advice given by our system to help you stay healthy. However, your care team may have specific advice just for you. Please talk to your care team about your preventive care needs.  Nutrition  Eat 5 or more servings of fruits and vegetables each day.  Try wheat bread, brown rice and whole grain pasta (instead of white bread, rice, and pasta).  Get enough calcium and vitamin D. Check the label on foods and aim for 100% of the RDA (recommended daily allowance).  Lifestyle  Exercise at least 150 minutes each week  (30 minutes a day, 5 days a week).  Do muscle strengthening activities 2 days a week. These help control your weight and prevent disease.  No smoking.  Wear sunscreen to prevent skin cancer.  Have a dental exam and cleaning every 6 months.  Yearly exams  See your health care team every year to talk about:  Any changes in your health.  Any medicines your care team has prescribed.  Preventive care, family planning, and ways to prevent chronic diseases.  Shots (vaccines)   HPV shots (up to age 26), if you've never had them before.  Hepatitis B shots (up to age 59), if you've never had them before.  COVID-19 shot: Get this shot when it's due.  Flu shot: Get a flu shot every year.  Tetanus shot: Get a tetanus shot every 10 years.  Pneumococcal, hepatitis A, and RSV shots: Ask your care team if you need these based on your risk.  Shingles shot (for age 50 and up)  General health tests  Diabetes screening:  Starting at age 35, Get screened for diabetes at least every 3 years.  If you are younger than age 35, ask your care team if you should be screened for diabetes.  Cholesterol test: At age 39, start having a cholesterol test every 5 years, or more often if advised.  Bone density scan (DEXA): At age 50, ask your care team if you should have this scan for osteoporosis (brittle bones).  Hepatitis C: Get tested at least once in your life.  STIs (sexually  transmitted infections)  Before age 24: Ask your care team if you should be screened for STIs.  After age 24: Get screened for STIs if you're at risk. You are at risk for STIs (including HIV) if:  You are sexually active with more than one person.  You don't use condoms every time.  You or a partner was diagnosed with a sexually transmitted infection.  If you are at risk for HIV, ask about PrEP medicine to prevent HIV.  Get tested for HIV at least once in your life, whether you are at risk for HIV or not.  Cancer screening tests  Cervical cancer screening: If you have a cervix, begin getting regular cervical cancer screening tests starting at age 21.  Breast cancer scan (mammogram): If you've ever had breasts, begin having regular mammograms starting at age 40. This is a scan to check for breast cancer.  Colon cancer screening: It is important to start screening for colon cancer at age 45.  Have a colonoscopy test every 10 years (or more often if you're at risk) Or, ask your provider about stool tests like a FIT test every year or Cologuard test every 3 years.  To learn more about your testing options, visit:   .  For help making a decision, visit:   https://bit.ly/ee04802.  Prostate cancer screening test: If you have a prostate, ask your care team if a prostate cancer screening test (PSA) at age 55 is right for you.  Lung cancer screening: If you are a current or former smoker ages 50 to 80, ask your care team if ongoing lung cancer screenings are right for you.  For informational purposes only. Not to replace the advice of your health care provider. Copyright   2023 [facility]. All rights reserved. Clinically reviewed by the [facility]. [employer] [id number] - REV 01/24.     Lung Cancer Screening   Frequently Asked Questions  If you are at high-risk for lung cancer, getting screened with low-dose computed tomography (LDCT) every year can help save your life. This handout offers  answers to some of the most common questions about lung cancer screening. If you have other questions, please call [phone] ([phone]).     What is it?  Lung cancer screening uses special X-ray technology to create an image of your lung tissue. The exam is quick and easy and takes less than 10 seconds. We don t give you any medicine or use any needles. You can eat before and after the exam. You don t need to change your clothes as long as the clothing on your chest doesn t contain metal. But, you do need to be able to hold your breath for at least 6 seconds during the exam.    What is the goal of lung cancer screening?  The goal of lung cancer screening is to save lives. Many times, lung cancer is not found until a person starts having physical symptoms. Lung cancer screening can help detect lung cancer in the earliest stages when it may be easier to treat.    Who should be screened for lung cancer?  We suggest lung cancer screening for anyone who is at high-risk for lung cancer. You are in the high-risk group if you:      are between the ages of 55 and 79, and    have smoked at least 1 pack of cigarettes a day for 20 or more years, and    still smoke or have quit within the past 15 years.    However, if you have a new cough or shortness of breath, you should talk to your doctor before being screened.    Why does it matter if I have symptoms?  Certain symptoms can be a sign that you have a condition in your lungs that should be checked and treated by your doctor. These symptoms include fever, chest pain, a new or changing cough, shortness of breath that you have never felt before, coughing up blood or unexplained weight loss. Having any of these symptoms can greatly affect the results of lung cancer screening.       Should all smokers get an LDCT lung cancer screening exam?  It depends. Lung cancer screening is for a very specific group of men and women who have a history of heavy smoking over a long  period of time (see  Who should be screened for lung cancer  above).  I am in the high-risk group, but have been diagnosed with cancer in the past. Is LDCT lung cancer screening right for me?  In some cases, you should not have LDCT lung screening, such as when your doctor is already following your cancer with CT scan studies. Your doctor will help you decide if LDCT lung screening is right for you.  Do I need to have a screening exam every year?  Yes. If you are in the high-risk group described earlier, you should get an LDCT lung cancer screening exam every year until you are 79, or are no longer willing or able to undergo screening and possible procedures to diagnose and treat lung cancer.  How effective is LDCT at preventing death from lung cancer?  Studies have shown that LDCT lung cancer screening can lower the risk of death from lung cancer by 20 percent in people who are at high-risk.  What are the risks?  There are some risks and limitations of LDCT lung cancer screening. We want to make sure you understand the risks and benefits, so please let us know if you have any questions. Your doctor may want to talk with you more about these risks.    Radiation exposure: As with any exam that uses radiation, there is a very small increased risk of cancer. The amount of radiation in LDCT is small--about the same amount a person would get from a mammogram. Your doctor orders the exam when he or she feels the potential benefits outweigh the risks.    False negatives: No test is perfect, including LDCT. It is possible that you may have a medical condition, including lung cancer, that is not found during your exam. This is called a false negative result.    False positives and more testing: LDCT very often finds something in the lung that could be cancer, but in fact is not. This is called a false positive result. False positive tests often cause anxiety. To make sure these findings are not cancer, you may need to have  more tests. These tests will be done only if you give us permission. Sometimes patients need a treatment that can have side effects, such as a biopsy. For more information on false positives, see  What can I expect from the results?     Findings not related to lung cancer: Your LDCT exam also takes pictures of areas of your body next to your lungs. In a very small number of cases, the CT scan will show an abnormal finding in one of these areas, such as your kidneys, adrenal glands, liver or thyroid. This finding may not be serious, but you may need more tests. Your doctor can help you decide what other tests you may need, if any.  What can I expect from the results?  About 1 out of 4 LDCT exams will find something that may need more tests. Most of the time, these findings are lung nodules. Lung nodules are very small collections of tissue in the lung. These nodules are very common, and the vast majority--more than 97 percent--are not cancer (benign). Most are normal lymph nodes or small areas of scarring from past infections.  But, if a small lung nodule is found to be cancer, the cancer can be cured more than 90 percent of the time. To know if the nodule is cancer, we may need to get more images before your next yearly screening exam. If the nodule has suspicious features (for example, it is large, has an odd shape or grows over time), we will refer you to a specialist for further testing.  Will my doctor also get the results?  Yes. Your doctor will get a copy of your results.  Is it okay to keep smoking now that there s a cancer screening exam?  No. Tobacco is one of the strongest cancer-causing agents. It causes not only lung cancer, but other cancers and cardiovascular (heart) diseases as well. The damage caused by smoking builds over time. This means that the longer you smoke, the higher your risk of disease. While it is never too late to quit, the sooner you quit, the better.  Where can I find help to quit  smoking?  The best way to prevent lung cancer is to stop smoking. If you have already quit smoking, congratulations and keep it up! For help on quitting smoking, please call [facility] at 1-233-QUITNOW (1-860.200.7312) or the American [employer] Society at 1-595.464.8043 to find local resources near you.  One-on-one health coaching:  If you d prefer to work individually with a health care provider on tobacco cessation, we offer:      Medication Therapy Management:  Our specially trained pharmacists work closely with you and your doctor to help you quit smoking.  Call [phone] or [phone] (toll free).

## 2025-02-13 NOTE — PROGRESS NOTES
"Preventive Care Visit  [facility] ESPERANZA Wolf MD, Family Medicine  Feb 13, 2025      Assessment & Plan     (Z00.00) Encounter for Medicare annual wellness exam  (primary encounter diagnosis)  Comment: We discussed exercise 30mins/day, and calcium with vitamin D at 1200mg/day, preferably from dietary sources.  Diet, Weight loss, and Exercise were discussed as well.  Discussed vaccines and screenings        (E78.00) Hypercholesteremia  Comment: lipids improved greatly on current dose of lipitor   Plan: atorvastatin (LIPITOR) 20 MG tablet, Lipid         panel reflex to direct LDL Fasting            (I10) Benign essential hypertension  Comment: discussed and refilled meds plus check labs   Plan: hydrochlorothiazide (MICROZIDE) 12.5 MG         capsule, Comprehensive metabolic panel (BMP +         Alb, Alk Phos, ALT, AST, Total. Bili, TP)            (I10) Hypertension, unspecified type  Comment: doing well. The patient indicates understanding of these issues and agrees with the plan.   Plan: lisinopril (ZESTRIL) 20 MG tablet,         Comprehensive metabolic panel (BMP + Alb, Alk         Phos, ALT, AST, Total. Bili, TP)            (C61) Prostate cancer (H)  Comment: monitored by [facility]   Plan:     (Z87.891) Personal history of tobacco use  Comment: discussed risks/benefits. He would like to do this screening   Plan:  fee: Shared Decision Making for Lung         Cancer Screening, CT Chest Lung Cancer Scrn Low        Dose wo            BMI  Estimated body mass index is 31.32 kg/m  as calculated from the following:    Height as of this encounter: 1.778 m (5' 10\").    Weight as of this encounter: 99 kg (218 lb 4.8 oz).   Weight management plan: Discussed healthy diet and exercise guidelines    Counseling  Appropriate preventive services were addressed with this patient via screening, questionnaire, or discussion as appropriate for fall prevention, nutrition, physical activity, Tobacco-use " cessation, social engagement, weight loss and cognition.  Checklist reviewing preventive services available has been given to the patient.  Reviewed patient's diet, addressing concerns and/or questions.   He is at risk for lack of exercise and has been provided with information to increase physical activity for the benefit of his well-being.         Caron Rousseau is a 69 year old, presenting for the following:  Physical        2/13/2025     8:07 AM   Additional Questions   Roomed by NATALIO Gurrola   Accompanied by self           HPI      Fasting today     On lipitor for cholesterol    Taking blood pressure    Meds regularly    [facility] :  Psa done 2 weeks ago - 5.1 - per his report. Not in chart     BP Readings from Last 6 Encounters:   02/13/25 124/76   04/25/24 137/78   03/15/24 130/80   12/27/23 (!) 132/95   09/13/23 136/86   12/22/22 (!) 163/93           Health Care Directive  Patient does not have a Health Care Directive: Discussed advance care planning with patient; information given to patient to review.      2/11/2025   General Health   How would you rate your overall physical health? Good   Feel stress (tense, anxious, or unable to sleep) Not at all         2/11/2025   Nutrition   Diet: Regular (no restrictions)         2/11/2025   Exercise   Days per week of moderate/strenous exercise 3 days   Average minutes spent exercising at this level 40 min         2/11/2025   Social Factors   Frequency of gathering with friends or relatives Once a week   Worry food won't last until get money to buy more No   Food not last or not have enough money for food? No   Do you have housing? (Housing is defined as stable permanent housing and does not include staying ouside in a car, in a tent, in an abandoned building, in an overnight shelter, or couch-surfing.) Yes   Are you worried about losing your housing? No   Lack of transportation? No   Unable to get utilities (heat,electricity)? No         2/11/2025   Fall Risk    Fallen 2 or more times in the past year? No   Trouble with walking or balance? No          2/11/2025   Activities of Daily Living- Home Safety   Needs help with the following daily activites None of the above   Safety concerns in the home None of the above         2/11/2025   Dental   Dentist two times every year? Yes         2/11/2025   Hearing Screening   Hearing concerns? None of the above         2/11/2025   Driving Risk Screening   Patient/family members have concerns about driving No         2/11/2025   General Alertness/Fatigue Screening   Have you been more tired than usual lately? No         2/11/2025   Urinary Incontinence Screening   Bothered by leaking urine in past 6 months No            Today's PHQ-2 Score:       2/13/2025     7:59 AM   PHQ-2 ( 1999 [employer])   Q1: Little interest or pleasure in doing things 0   Q2: Feeling down, depressed or hopeless 0   PHQ-2 Score 0    Q1: Little interest or pleasure in doing things Not at all   Q2: Feeling down, depressed or hopeless Not at all   PHQ-2 Score 0       Patient-reported           2/11/2025   Substance Use   Alcohol more than 3/day or more than 7/wk No   Do you have a current opioid prescription? No   How severe/bad is pain from 1 to 10? 0/10 (No Pain)   Do you use any other substances recreationally? No     Social History     Tobacco Use    Smoking status: Former     Current packs/day: 0.75     Types: Cigarettes    Smokeless tobacco: Never   Substance Use Topics    Alcohol use: Yes     Comment: Alcoholic Drinks/day: 4 drinks per wk    Drug use: No       Last PSA:   Prostate Specific Antigen Screen   Date Value Ref Range Status   12/15/2022 2.97 0.00 - 4.50 ng/mL Final   06/23/2021 6.1 (H) 0.0 - 4.5 ng/mL Final     ASCVD Risk   The 10-year ASCVD risk score (Nayely VILLAGRAN, et al., 2019) is: 18.4%    Values used to calculate the score:      Age: 69 years      Sex: Male      Is Non- : No      Diabetic: No      Tobacco smoker:  "No      Systolic Blood Pressure: 124 mmHg      Is BP treated: Yes      HDL Cholesterol: 34 mg/dL      Total Cholesterol: 141 mg/dL            Reviewed and updated as needed this visit by Provider                      Current providers sharing in care for this patient include:  Patient Care Team:  Delphine Wolf MD as PCP - General (Family Medicine)  Delphine Wolf MD as Assigned PCP  Donnell Silveira MD as Assigned Heart and Vascular Provider  Saleem Barakat DO as Assigned Musculoskeletal Provider    The following health maintenance items are reviewed in Epic and correct as of today:  Health Maintenance   Topic Date Due    ZOSTER IMMUNIZATION (1 of 2) Never done    HEPATITIS B IMMUNIZATION (1 of 3 - Risk 3-dose series) Never done    RSV VACCINE (1 - Risk 60-74 years 1-dose series) Never done    BMP  12/27/2024    LUNG CANCER SCREENING  01/03/2025    ANNUAL REVIEW OF HM ORDERS  04/15/2025    COVID-19 Vaccine (8 - 2024-25 season) 05/14/2025    LIPID  05/15/2025    MEDICARE ANNUAL WELLNESS VISIT  02/13/2026    FALL RISK ASSESSMENT  02/13/2026    DTAP/TDAP/TD IMMUNIZATION (3 - Td or Tdap) 09/12/2026    GLUCOSE  12/27/2026    COLORECTAL CANCER SCREENING  03/16/2027    ADVANCE CARE PLANNING  02/13/2030    HEPATITIS C SCREENING  Completed    PHQ-2 (once per calendar year)  Completed    INFLUENZA VACCINE  Completed    Pneumococcal Vaccine: 50+ Years  Completed    HEPATITIS A IMMUNIZATION  Completed    AORTIC ANEURYSM SCREENING (SYSTEM ASSIGNED)  Completed    HPV IMMUNIZATION  Aged Out    MENINGITIS IMMUNIZATION  Aged Out         Review of Systems  Constitutional, neuro, ENT, endocrine, pulmonary, cardiac, gastrointestinal, genitourinary, musculoskeletal, integument and psychiatric systems are negative, except as otherwise noted.     Objective    Exam  /76   Pulse 68   Temp 97  F (36.1  C) (Tympanic)   Resp 16   Ht 1.778 m (5' 10\")   Wt 99 kg (218 lb 4.8 oz)   SpO2 94%   BMI 31.32 kg/m     Estimated body " "mass index is 31.32 kg/m  as calculated from the following:    Height as of this encounter: 1.778 m (5' 10\").    Weight as of this encounter: 99 kg (218 lb 4.8 oz).    Physical Exam  GENERAL: alert and no distress  EYES: Eyes grossly normal to inspection, PERRL and conjunctivae and sclerae normal  NECK: no adenopathy, no asymmetry, masses, or scars  RESP: lungs clear to auscultation - no rales, rhonchi or wheezes  CV: regular rate and rhythm, normal S1 S2, no S3 or S4, no murmur, click or rub, no peripheral edema  MS: no gross musculoskeletal defects noted, no edema  SKIN: no suspicious lesions or rashes  NEURO: Normal strength and tone, mentation intact and speech normal  PSYCH: mentation appears normal, affect normal/bright         2/13/2025   Mini Cog   Clock Draw Score 2 Normal    2 Normal   3 Item Recall 2 objects recalled    3 objects recalled   Mini Cog Total Score 4    5       Multiple values from one day are sorted in reverse-chronological order              Signed Electronically by: Delphine Wolf MD    Lung Cancer Screening Shared Decision Making Visit     Gualberto Wood, a 69 year old male, is eligible for lung cancer screening    History   Smoking Status    Former    Types: Cigarettes   Smokeless Tobacco    Never       I have discussed with patient the risks and benefits of screening for lung cancer with low-dose CT.     The risks include:    radiation exposure: one low dose chest CT has as much ionizing radiation as about 15 chest x-rays, or 6 months of background radiation living in [place]      false positives: most findings/nodules are NOT cancer, but some might still require additional diagnostic evaluation, including biopsy    over-diagnosis: some slow growing cancers that might never have been clinically significant will be detected and treated unnecessarily     The benefit of early detection of lung cancer is contingent upon adherence to annual screening or more frequent follow up if " indicated.     Furthermore, to benefit from screening, Gualberto must be willing and able to undergo diagnostic procedures, if indicated. Although no specific guide is available for determining severity of comorbidities, it is reasonable to withhold screening in patients who have greater mortality risk from other diseases.     We did discuss that the best way to prevent lung cancer is to not smoke.    Some patients may value a numeric estimation of lung cancer risk when evaluating if lung cancer screening is right for them, here is one calculator:    ShouldIScreen

## 2025-04-19 ASSESSMENT — ACTIVITIES OF DAILY LIVING (ADL)
GO DOWN STAIRS: ACTIVITY IS MINIMALLY DIFFICULT
SQUAT: ACTIVITY IS SOMEWHAT DIFFICULT
SQUAT: ACTIVITY IS SOMEWHAT DIFFICULT
KNEE_ACTIVITY_OF_DAILY_LIVING_SCORE: 81.43
WEAKNESS: I DO NOT HAVE THE SYMPTOM
GIVING WAY, BUCKLING OR SHIFTING OF KNEE: I DO NOT HAVE THE SYMPTOM
WALK: ACTIVITY IS NOT DIFFICULT
SWELLING: I DO NOT HAVE THE SYMPTOM
SIT WITH YOUR KNEE BENT: ACTIVITY IS SOMEWHAT DIFFICULT
RISE FROM A CHAIR: ACTIVITY IS MINIMALLY DIFFICULT
PAIN: THE SYMPTOM AFFECTS MY ACTIVITY SLIGHTLY
LIMPING: I DO NOT HAVE THE SYMPTOM
AS_A_RESULT_OF_YOUR_KNEE_INJURY,_HOW_WOULD_YOU_RATE_YOUR_CURRENT_LEVEL_OF_DAILY_ACTIVITY?: NEARLY NORMAL
KNEEL ON THE FRONT OF YOUR KNEE: ACTIVITY IS SOMEWHAT DIFFICULT
PLEASE_INDICATE_YOR_PRIMARY_REASON_FOR_REFERRAL_TO_THERAPY:: KNEE
PAIN: THE SYMPTOM AFFECTS MY ACTIVITY SLIGHTLY
RISE FROM A CHAIR: ACTIVITY IS MINIMALLY DIFFICULT
HOW_WOULD_YOU_RATE_THE_OVERALL_FUNCTION_OF_YOUR_KNEE_DURING_YOUR_USUAL_DAILY_ACTIVITIES?: NEARLY NORMAL
GO UP STAIRS: ACTIVITY IS MINIMALLY DIFFICULT
STIFFNESS: THE SYMPTOM AFFECTS MY ACTIVITY SLIGHTLY
KNEE_ACTIVITY_OF_DAILY_LIVING_SUM: 57
GIVING WAY, BUCKLING OR SHIFTING OF KNEE: I DO NOT HAVE THE SYMPTOM
WEAKNESS: I DO NOT HAVE THE SYMPTOM
WALK: ACTIVITY IS NOT DIFFICULT
KNEEL ON THE FRONT OF YOUR KNEE: ACTIVITY IS SOMEWHAT DIFFICULT
RAW_SCORE: 57
SIT WITH YOUR KNEE BENT: ACTIVITY IS SOMEWHAT DIFFICULT
AS_A_RESULT_OF_YOUR_KNEE_INJURY,_HOW_WOULD_YOU_RATE_YOUR_CURRENT_LEVEL_OF_DAILY_ACTIVITY?: NEARLY NORMAL
STAND: ACTIVITY IS NOT DIFFICULT
STAND: ACTIVITY IS NOT DIFFICULT
HOW_WOULD_YOU_RATE_THE_OVERALL_FUNCTION_OF_YOUR_KNEE_DURING_YOUR_USUAL_DAILY_ACTIVITIES?: NEARLY NORMAL
STIFFNESS: THE SYMPTOM AFFECTS MY ACTIVITY SLIGHTLY
LIMPING: I DO NOT HAVE THE SYMPTOM
GO DOWN STAIRS: ACTIVITY IS MINIMALLY DIFFICULT
SWELLING: I DO NOT HAVE THE SYMPTOM
GO UP STAIRS: ACTIVITY IS MINIMALLY DIFFICULT

## 2025-04-22 ENCOUNTER — THERAPY VISIT (OUTPATIENT)
Age: 70
End: 2025-04-22
Attending: STUDENT IN AN ORGANIZED HEALTH CARE EDUCATION/TRAINING PROGRAM
Payer: COMMERCIAL

## 2025-04-22 DIAGNOSIS — M17.11 PRIMARY OSTEOARTHRITIS OF RIGHT KNEE: ICD-10-CM

## 2025-04-22 PROCEDURE — 97161 PT EVAL LOW COMPLEX 20 MIN: CPT | Mod: GP | Performed by: PHYSICAL THERAPIST

## 2025-04-22 PROCEDURE — 97110 THERAPEUTIC EXERCISES: CPT | Mod: GP | Performed by: PHYSICAL THERAPIST

## 2025-04-22 NOTE — PROGRESS NOTES
PHYSICAL THERAPY EVALUATION  Type of Visit: Evaluation       Fall Risk Screen:  Have you fallen 2 or more times in the past year?: No  Have you fallen and had an injury in the past year?: No    Subjective         Presenting condition or subjective complaint: Doctor advised to try therapy  Had R knee injection in February. Since then not having pain with activities besides full knee bend.   Does typically walk a few days/week, for 1.5-2 miles.  Denies mechanical knee symptoms.   Date of onset: 02/28/25 (MD order, symptoms chronic)    Relevant medical history:     Dates & types of surgery: In records at [facility]    Prior diagnostic imaging/testing results: X-ray     Prior therapy history for the same diagnosis, illness or injury: No      Prior Level of Function  Transfers:   Ambulation:   ADL:   IADL:     Living Environment  Social support: With a significant other or spouse   Type of home: [facility]   Stairs to enter the home: Yes 4 Is there a railing: Yes     Ramp: No   Stairs inside the home: Yes 16 Is there a railing: Yes     Help at home: None  Equipment owned:       Employment: No    Hobbies/Interests:      Patient goals for therapy:      Pain assessment: See objective evaluation for additional pain details     Objective   KNEE EVALUATION  PAIN: Pain Location: knee Medial border of R knee  INTEGUMENTARY (edema, incisions):   POSTURE:   GAIT:  Weightbearing Status:   Assistive Device(s): None  Gait Deviations: WNL  BALANCE/PROPRIOCEPTION:   WEIGHTBEARING ALIGNMENT:   NON-WEIGHTBEARING ALIGNMENT:   ROM:   R knee 0-5-125  L knee 0-    STRENGTH: Knee flexion and extension  5-/5  Hip abduction 4/5  FLEXIBILITY: decreased hamstrings B  SPECIAL TESTS: Negative guanaco, varus/valgus  FUNCTIONAL TESTS: Double Leg Squat: Anterior knee translation, Knee valgus, Hip internal rotation, and Improper use of glutes/hips  PALPATION: no significant tenderness to palpation  JOINT MOBILITY:     Assessment & Plan   CLINICAL  IMPRESSIONS  Medical Diagnosis: Primary osteoarthritis of right knee (M17.11)  - Primary    Treatment Diagnosis: Chronic right knee pain   Impression/Assessment: Patient is a 69 year old male with chronic right knee pain complaints.  The following significant findings have been identified: Pain, Decreased ROM/flexibility, Decreased joint mobility, Decreased strength, Impaired balance, Impaired muscle performance, and Decreased activity tolerance. These impairments interfere with their ability to perform self care tasks, recreational activities, household mobility, and community mobility as compared to previous level of function.     Clinical Decision Making (Complexity):  Clinical Presentation: Stable/Uncomplicated  Clinical Presentation Rationale: based on medical and personal factors listed in PT evaluation  Clinical Decision Making (Complexity): Low complexity    PLAN OF CARE  Treatment Interventions:  Interventions: Neuromuscular Re-education, Therapeutic Exercise    Long Term Goals     PT Goal 1  Goal Identifier: 1  Goal Description: Pt will be I with HEP for ongoign managment of symptoms  Target Date: 04/22/25  Date Met: 04/22/25      Frequency of Treatment: 1x/week  Duration of Treatment: 1 time visit    Recommended Referrals to Other Professionals:   Education Assessment:        Risks and benefits of evaluation/treatment have been explained.   Patient/Family/caregiver agrees with Plan of Care.     Evaluation Time:     PT Eval, Low Complexity Minutes (06644): 10       Signing Clinician: Mary Mendez PT        [facility]                                                                                   OUTPATIENT PHYSICAL THERAPY      PLAN OF TREATMENT FOR OUTPATIENT REHABILITATION   Patient's Last Name, First Name, Gualberto Houston YOB: 1955   Provider's Name   [facility]   Medical Record No.  [id number]     Onset Date: 02/28/25  (MD order, symptoms chronic)  Start of Care Date: 04/22/25     Medical Diagnosis:  Primary osteoarthritis of right knee (M17.11)  - Primary      PT Treatment Diagnosis:  Chronic right knee pain Plan of Treatment  Frequency/Duration: 1x/week/ 1 time visit    Certification date from 04/22/25 to 04/22/25         See note for plan of treatment details and functional goals     Mary Mendez, PT                         I CERTIFY THE NEED FOR THESE SERVICES FURNISHED UNDER        THIS PLAN OF TREATMENT AND WHILE UNDER MY CARE     (Physician attestation of this document indicates review and certification of the therapy plan).              Referring Provider:  Saleem Barakat    Initial Assessment  See Epic Evaluation- Start of Care Date: 04/22/25

## 2025-07-10 ENCOUNTER — TRANSFERRED RECORDS (OUTPATIENT)
Dept: HEALTH INFORMATION MANAGEMENT | Facility: CLINIC | Age: 70
End: 2025-07-10
Payer: COMMERCIAL